# Patient Record
Sex: MALE | Race: WHITE | NOT HISPANIC OR LATINO | URBAN - METROPOLITAN AREA
[De-identification: names, ages, dates, MRNs, and addresses within clinical notes are randomized per-mention and may not be internally consistent; named-entity substitution may affect disease eponyms.]

---

## 2017-07-13 ENCOUNTER — OFFICE VISIT - HEALTHEAST (OUTPATIENT)
Dept: FAMILY MEDICINE | Facility: CLINIC | Age: 42
End: 2017-07-13

## 2017-07-13 DIAGNOSIS — M79.674 GREAT TOE PAIN, RIGHT: ICD-10-CM

## 2017-07-13 ASSESSMENT — MIFFLIN-ST. JEOR: SCORE: 2124.56

## 2018-02-14 ENCOUNTER — COMMUNICATION - HEALTHEAST (OUTPATIENT)
Dept: FAMILY MEDICINE | Facility: CLINIC | Age: 43
End: 2018-02-14

## 2018-04-18 ENCOUNTER — OFFICE VISIT - HEALTHEAST (OUTPATIENT)
Dept: FAMILY MEDICINE | Facility: CLINIC | Age: 43
End: 2018-04-18

## 2018-04-18 DIAGNOSIS — Z71.84 TRAVEL ADVICE ENCOUNTER: ICD-10-CM

## 2018-05-22 ENCOUNTER — COMMUNICATION - HEALTHEAST (OUTPATIENT)
Dept: FAMILY MEDICINE | Facility: CLINIC | Age: 43
End: 2018-05-22

## 2018-05-29 ENCOUNTER — AMBULATORY - HEALTHEAST (OUTPATIENT)
Dept: NURSING | Facility: CLINIC | Age: 43
End: 2018-05-29

## 2018-05-29 ENCOUNTER — AMBULATORY - HEALTHEAST (OUTPATIENT)
Dept: FAMILY MEDICINE | Facility: CLINIC | Age: 43
End: 2018-05-29

## 2018-05-29 DIAGNOSIS — Z23 IMMUNIZATION DUE: ICD-10-CM

## 2020-02-10 ENCOUNTER — OFFICE VISIT - HEALTHEAST (OUTPATIENT)
Dept: FAMILY MEDICINE | Facility: CLINIC | Age: 45
End: 2020-02-10

## 2020-02-10 DIAGNOSIS — Z71.84 TRAVEL ADVICE ENCOUNTER: ICD-10-CM

## 2020-02-10 DIAGNOSIS — Z00.00 ROUTINE GENERAL MEDICAL EXAMINATION AT A HEALTH CARE FACILITY: ICD-10-CM

## 2020-02-10 DIAGNOSIS — Z80.42 FAMILY HISTORY OF PROSTATE CANCER: ICD-10-CM

## 2020-02-10 DIAGNOSIS — R03.0 PREHYPERTENSION: ICD-10-CM

## 2020-02-10 LAB
ALBUMIN SERPL-MCNC: 4.2 G/DL (ref 3.5–5)
ALP SERPL-CCNC: 79 U/L (ref 45–120)
ALT SERPL W P-5'-P-CCNC: 33 U/L (ref 0–45)
ANION GAP SERPL CALCULATED.3IONS-SCNC: 7 MMOL/L (ref 5–18)
AST SERPL W P-5'-P-CCNC: 18 U/L (ref 0–40)
BILIRUB SERPL-MCNC: 0.6 MG/DL (ref 0–1)
BUN SERPL-MCNC: 14 MG/DL (ref 8–22)
CALCIUM SERPL-MCNC: 9.4 MG/DL (ref 8.5–10.5)
CHLORIDE BLD-SCNC: 107 MMOL/L (ref 98–107)
CHOLEST SERPL-MCNC: 219 MG/DL
CO2 SERPL-SCNC: 29 MMOL/L (ref 22–31)
CREAT SERPL-MCNC: 0.82 MG/DL (ref 0.7–1.3)
ERYTHROCYTE [DISTWIDTH] IN BLOOD BY AUTOMATED COUNT: 12.3 % (ref 11–14.5)
FASTING STATUS PATIENT QL REPORTED: ABNORMAL
GFR SERPL CREATININE-BSD FRML MDRD: >60 ML/MIN/1.73M2
GLUCOSE BLD-MCNC: 62 MG/DL (ref 70–125)
HCT VFR BLD AUTO: 49.7 % (ref 40–54)
HDLC SERPL-MCNC: 40 MG/DL
HGB BLD-MCNC: 17.2 G/DL (ref 14–18)
LDLC SERPL CALC-MCNC: ABNORMAL MG/DL
MCH RBC QN AUTO: 30.8 PG (ref 27–34)
MCHC RBC AUTO-ENTMCNC: 34.7 G/DL (ref 32–36)
MCV RBC AUTO: 89 FL (ref 80–100)
PLATELET # BLD AUTO: 196 THOU/UL (ref 140–440)
PMV BLD AUTO: 7.3 FL (ref 7–10)
POTASSIUM BLD-SCNC: 4.3 MMOL/L (ref 3.5–5)
PROT SERPL-MCNC: 7.4 G/DL (ref 6–8)
PSA SERPL-MCNC: 1.1 NG/ML (ref 0–2.5)
RBC # BLD AUTO: 5.6 MILL/UL (ref 4.4–6.2)
SODIUM SERPL-SCNC: 143 MMOL/L (ref 136–145)
TRIGL SERPL-MCNC: 444 MG/DL
WBC: 6.9 THOU/UL (ref 4–11)

## 2020-02-10 ASSESSMENT — MIFFLIN-ST. JEOR: SCORE: 2117.77

## 2020-02-19 ENCOUNTER — COMMUNICATION - HEALTHEAST (OUTPATIENT)
Dept: FAMILY MEDICINE | Facility: CLINIC | Age: 45
End: 2020-02-19

## 2020-03-02 ENCOUNTER — COMMUNICATION - HEALTHEAST (OUTPATIENT)
Dept: LAB | Facility: CLINIC | Age: 45
End: 2020-03-02

## 2020-03-02 DIAGNOSIS — E78.2 MIXED HYPERLIPIDEMIA: ICD-10-CM

## 2021-05-31 VITALS — BODY MASS INDEX: 30.69 KG/M2 | WEIGHT: 252 LBS | HEIGHT: 76 IN

## 2021-06-01 VITALS — BODY MASS INDEX: 31.32 KG/M2 | WEIGHT: 257.31 LBS

## 2021-06-04 VITALS
WEIGHT: 254 LBS | HEIGHT: 75 IN | HEART RATE: 94 BPM | SYSTOLIC BLOOD PRESSURE: 139 MMHG | BODY MASS INDEX: 31.58 KG/M2 | RESPIRATION RATE: 20 BRPM | DIASTOLIC BLOOD PRESSURE: 98 MMHG | TEMPERATURE: 97.2 F

## 2021-06-06 NOTE — TELEPHONE ENCOUNTER
"Patient coming in 3/9/20 for lipid recheck:    \"Notes recorded by Monica Paredes MD on 2/11/2020 at 5:32 PM CST  Triglycerides are elevated and some of this could be due that we did the lab without fasting.  At your convenience please set a fasting lab appointment for lipid profile.\"    Please place order, thanks!  "

## 2021-06-06 NOTE — TELEPHONE ENCOUNTER
Spouse notified. States he is aware vaccine is in and will schedule appointment within the next few weeks to come in and get it.

## 2021-06-06 NOTE — PATIENT INSTRUCTIONS - HE
Nurse apt in about a month for BP check. If over 140/90, will start a medicatin the then BP check 2 weeks later and MD visit 5 months.    Oral typoid good until April 2023.    Hepatitis A shot #2 and done.    Hepatitis B shot #2 today, # 3 in 4 months or after.    Japanese Encephalitis shot #3 ordered, good for 6-10 years from 2018 shots.    Per CDC website, Malaria rare in the are of [place] you age traveling to.  Let ,me know if group is recommending it and I can send a script.    Flu shot today.        Health Maintenance   Topic Date Due     PREVENTIVE CARE VISIT  Every 1 year     HIV SCREENING  NA     ADVANCE CARE PLANNING  We would like a copy please     LIPID  Today     INFLUENZA VACCINE RULE BASED (1) Today     TD 18+ HE  Today     TDAP ADULT ONE TIME DOSE  Completed     Colonoscopy due at age 49 as long as no blood in stool, no change in bowel habits, no abdominal pain, or family history of colon cancer or colon polyps

## 2021-06-06 NOTE — TELEPHONE ENCOUNTER
Left message to call back for: Vaccine   Information to relay to patient:  LMTCB to schedule appointment for japanese encephalitis vaccine.

## 2021-06-06 NOTE — PROGRESS NOTES
Assessment/Plan:  1. Routine general medical examination at a health care facility  Lipid Profile   2. Family history of prostate cancer  PSA, Diagnostic (Prostatic-Specific Antigen)   3. Prehypertension  HM2(CBC w/o Differential)    Comprehensive Metabolic Panel   4. Travel advice encounter  Japanese Encephalitis IM    CANCELED: Trinidadian Encephalitis IM     Nurse apt in about a month for BP check. If over 140/90, will start a medicatin the then BP check 2 weeks later and MD visit 5 months.    Oral typoid good until April 2023.    Hepatitis A shot #2 and done.    Hepatitis B shot #2 today, # 3 in 4 months or after.    Japanese Encephalitis shot #3 ordered, good for 6-10 years from 2018 shots.    Per [employer] website, Malaria rare in the area of [place] you are traveling to.  Let me know if group is recommending it and I can send a script.    Flu shot today.    Patient is a 44 y.o. male here for physical exam. See health maintenance section below. Labs as ordered.        HPI    Chief Complaint   Patient presents with     Annual Exam     No Concerns       Health Maintenance   Topic Date Due     PREVENTIVE CARE VISIT  Every 1 year, sooner if high BP     HIV SCREENING  NA     ADVANCE CARE PLANNING  We would like a copy please     LIPID  Today     INFLUENZA VACCINE RULE BASED (1) Today     TD 18+ HE  Today     TDAP ADULT ONE TIME DOSE  Completed     Colonoscopy due at age 49 as long as no blood in stool, no change in bowel habits, no abdominal pain, or family history of colon cancer or colon polyps    Patient Active Problem List   Diagnosis     Obesity     Benign Paroxysmal Positional Vertigo     No current outpatient medications on file.       Patient is a 44 y.o. male presents for a physical exam and travel consult.    Elevated blood pressure: Patient has never been diagnosed with hypertension.  Is willing to watch this and start a medication if needed.  He does not smoke.  Denies any chest pain on exertion.    Travel  consult: He will be traveling to [facility] I believe in April.  The region he is traveling to is not a high risk of malaria.  He is due to finish up his hepatitis A series and continue hepatitis B shot series.  He did have 2 Chinese encephalitis shots in April 2018.  He had oral typhoid April 18, 2018 that is good for 5 years.  Per a lid search on up-to-date he is traveling to a Japanese encephalitis concerning area and is to have a third Japanese encephalitis shot.  This will then be good for 6 to 10 years from 2018.  This is not a risk for yellow fever.  No other concerns.    The following portions of the patient's history were reviewed and updated as appropriate: past medical history, past social history, past surgical history and problem list.    Review of Systems  Pertinent items are noted in HPI.  Immunization History   Administered Date(s) Administered     Hep A, Adult IM (19yr & older) 04/18/2018, 02/10/2020     Hep B, Adult 04/18/2018, 02/10/2020     Influenza,seasonal quad, PF, =/> 6months 02/10/2020     Japanese Encephalitis, IM 04/18/2018, 05/29/2018     Tdap 07/16/2010, 02/10/2020     Recent Results (from the past 240 hour(s))   HM2(CBC w/o Differential)   Result Value Ref Range    WBC 6.9 4.0 - 11.0 thou/uL    RBC 5.60 4.40 - 6.20 mill/uL    Hemoglobin 17.2 14.0 - 18.0 g/dL    Hematocrit 49.7 40.0 - 54.0 %    MCV 89 80 - 100 fL    MCH 30.8 27.0 - 34.0 pg    MCHC 34.7 32.0 - 36.0 g/dL    RDW 12.3 11.0 - 14.5 %    Platelets 196 140 - 440 thou/uL    MPV 7.3 7.0 - 10.0 fL   PSA, Diagnostic (Prostatic-Specific Antigen)   Result Value Ref Range    PSA 1.1 0.0 - 2.5 ng/mL   Lipid Profile   Result Value Ref Range    Triglycerides 444 (H) <=149 mg/dL    Cholesterol 219 (H) <=199 mg/dL    LDL Calculated      HDL Cholesterol 40 >=40 mg/dL    Patient Fasting > 8hrs? Unknown    Comprehensive Metabolic Panel   Result Value Ref Range    Sodium 143 136 - 145 mmol/L    Potassium 4.3 3.5 - 5.0 mmol/L    Chloride  "107 98 - 107 mmol/L    CO2 29 22 - 31 mmol/L    Anion Gap, Calculation 7 5 - 18 mmol/L    Glucose 62 (L) 70 - 125 mg/dL    BUN 14 8 - 22 mg/dL    Creatinine 0.82 0.70 - 1.30 mg/dL    GFR MDRD Af Amer >60 >60 mL/min/1.73m2    GFR MDRD Non Af Amer >60 >60 mL/min/1.73m2    Bilirubin, Total 0.6 0.0 - 1.0 mg/dL    Calcium 9.4 8.5 - 10.5 mg/dL    Protein, Total 7.4 6.0 - 8.0 g/dL    Albumin 4.2 3.5 - 5.0 g/dL    Alkaline Phosphatase 79 45 - 120 U/L    AST 18 0 - 40 U/L    ALT 33 0 - 45 U/L     I have had an Advance Directives discussion with the patient.  Objective:    BP (!) 139/98   Pulse 94   Temp 97.2  F (36.2  C) (Oral)   Resp 20   Ht 6' 3\" (1.905 m)   Wt (!) 254 lb (115.2 kg)   BMI 31.75 kg/m      BP (!) 139/98   Pulse 94   Temp 97.2  F (36.2  C) (Oral)   Resp 20   Ht 6' 3\" (1.905 m)   Wt (!) 254 lb (115.2 kg)   BMI 31.75 kg/m      General Appearance:    Alert, cooperative, no distress, appears stated age   Head:    Normocephalic, without obvious abnormality, atraumatic   Eyes:    PERRL, conjunctiva/corneas clear, EOM's intact, fundi     benign, both eyes        Ears:    Normal TM's and external ear canals, both ears   Nose:   Nares normal, septum midline, mucosa normal, no drainage    or sinus tenderness   Throat:   Lips, mucosa, and tongue normal; teeth and gums normal   Neck:   Supple, symmetrical, trachea midline, no adenopathy;        thyroid:  No enlargement/tenderness/nodules; no carotid    bruit or JVD   Back:     Symmetric, no curvature, ROM normal, no CVA tenderness   Lungs:     Clear to auscultation bilaterally, respirations unlabored   Chest wall:    No tenderness or deformity   Heart:    Regular rate and rhythm, S1 and S2 normal, no murmur, rub   or gallop   Abdomen:     Soft, non-tender, bowel sounds active all four quadrants,     no masses, no organomegaly   Genitalia:    Normal male without lesion, discharge or tenderness       Extremities:   Extremities normal, atraumatic, no cyanosis or " edema   Pulses:   2+ and symmetric all extremities   Skin:   Skin color, texture, turgor normal, no rashes or lesions   Lymph nodes:   Cervical, supraclavicular, and axillary nodes normal   Neurologic:   CNII-XII intact. Normal strength, sensation and reflexes       throughout

## 2021-06-11 NOTE — PROGRESS NOTES
Labs are normal, but uric acid is slightly on the high side. Will see how patient is doing with symptoms.

## 2021-06-11 NOTE — PROGRESS NOTES
"Assessment & Plan:  1. Great toe pain, right  Suspect gout, no injury that would have contributed to pain.  No history of arthritis.  Check level today and hold for results.  Patient given prescription for indomethacin to use and will let us know how it is working.  - Uric Acid  - HM2(CBC w/o Differential)  - C-Reactive Protein(CRP)      There are no Patient Instructions on file for this visit.    Orders Placed This Encounter   Procedures     Uric Acid     HM2(CBC w/o Differential)     C-Reactive Protein(CRP)     There are no discontinued medications.            Chief Complaint:   Chief Complaint   Patient presents with     Toe Pain     c/o big tow on right foot being swollen x 1 wk       History of Present Illness:  Mingo is a 41 y.o. male presenting to the clinic today with about 4-5 days of big toe pain on the right.  Been painful, swollen patient has been using intermittent ibuprofen.  There is been no known injury to the patella.  He rates the pain a 4 out of 10 today.  No history of arthritis or other musculoskeletal problems.  No pain in the other areas of the body..     Review of Systems:  All other systems are negative except as noted above.    PFSH:  Reviewed and updated    Tobacco Use:  History   Smoking Status     Never Smoker   Smokeless Tobacco     Not on file       Vitals:  Vitals:    07/13/17 0844   BP: 122/74   Patient Site: Right Arm   Patient Position: Sitting   Cuff Size: Adult Regular   Resp: 16   Weight: (!) 252 lb (114.3 kg)   Height: 6' 4\" (1.93 m)     Wt Readings from Last 3 Encounters:   07/13/17 (!) 252 lb (114.3 kg)       Physical Exam:  Constitutional:  Reveals an alert, cooperative, 41-year-old male in no acute distress.  Vitals:  Per nursing notes.  Musculoskeletal: CMS intact, distal pulses intact, right great toe swollen, slightly reddened.  Tender to touch.  Rheumatologic: Normal joints and nails of the hands.  Neurologic:  No gross focal deficits.   Lymph: No " lymphadenopathy.  Psychiatric:  Mood appropriate, memory intact.     Data Reviewed:  Additional History from Old Records or Another Person Summarized (2 total): None.     Decision to Obtain Extra information (1 total): None.     Radiology Tests Summarized and Ordered (XRAY/CT/MRI/DXA) (1 total): None.    Labs Reviewed and Ordered (1 total): None.    Medicine Tests Summarized and Ordered (EKG/ECHO/COLONOSCOPY/EGD) (1 total): None.    Independent Review of EKG or X-Ray (2 each): None.    The visit lasted a total of 20 minutes face to face with the patient. Over 50% of the time was spent counseling and educating the patient about plan of.    Medications:  Current Outpatient Prescriptions   Medication Sig Dispense Refill     indomethacin (INDOCIN) 50 MG capsule Take 1 capsule (50 mg total) by mouth 2 (two) times a day with meals. 20 capsule 0     No current facility-administered medications for this visit.        Total Data Points: 1    BEA Cunningham, CNP    This note has been dictated using voice recognition software. Any grammatical or context distortions are unintentional and inherent to the software

## 2021-06-17 NOTE — PROGRESS NOTES
ASSESSMENT & PLAN:  1. Travel advice encounter  Japanese Encephalitis IM       Patient Instructions   Hepatitis A shot # 1 today, # 2 in 6 months. Hepatitis B shot # 1 today, # 2 in 1 month, # 3 in 6 months.    Pack Pepto Bismol.     Set physical/ travel consult in August/September set physical/travel consult. Come fasting.     Oral Typhoid good for 5 years.     Japanese Encephalitis injection #1  and #2 a month after.     Take Cipro 500mg twice a day for 5 to 7 days for severe travelers diarrhea.           Z-pack given to take on trip.    Orders Placed This Encounter   Procedures     Hepatitis A adult 2 dose IM (19 yr & older)     Hepatitis B Vaccine Age 20 years and above     Persian Encephalitis IM     There are no discontinued medications.    No Follow-up on file.    CHIEF COMPLAINT:  Chief Complaint   Patient presents with     Travel Consult     mission trip late june       HISTORY OF PRESENT ILLNESS:  Mingo is a 42 y.o. male presenting to the clinic today for travel consult.     Travel Consult: He will be going to [facility] at the end of June for 2 weeks for a [employer] mission trip which will take place primarily indoors. He has gone to [place] in the past. He is amenable to receiving Japanese Encephalitis and typhoid prevention. He denies reactions to injections.     REVIEW OF SYSTEMS:   All other systems are negative.    PFSH:  Social: He is going to [facility] at the end of the year.   TOBACCO USE:  History   Smoking Status     Never Smoker   Smokeless Tobacco     Never Used       VITALS:  Vitals:    04/18/18 1141   BP: 134/80   Patient Site: Left Arm   Patient Position: Sitting   Cuff Size: Adult Large   Pulse: 72   Resp: 16   Weight: (!) 257 lb 5 oz (116.7 kg)     Wt Readings from Last 3 Encounters:   04/18/18 (!) 257 lb 5 oz (116.7 kg)   07/13/17 (!) 252 lb (114.3 kg)     Body mass index is 31.32 kg/(m^2).    PHYSICAL EXAM:  /80 (Patient Site: Left Arm, Patient Position: Sitting, Cuff Size: Adult Large)   Pulse 72  Resp 16  Wt (!) 257 lb 5 oz (116.7 kg)  BMI 31.32 kg/m2  General appearance: alert, appears stated age and cooperative  Neurologic: Grossly normal   Psych: Normal mood and affect.       QUALITY MEASURES:      ADDITIONAL HISTORY SUMMARIZED (2): None.  DECISION TO OBTAIN EXTRA INFORMATION (1): None.   RADIOLOGY TESTS (1): None.  LABS (1): None.  MEDICINE TESTS (1): None.  INDEPENDENT REVIEW (2 each): None.     The visit lasted a total of 20 minutes face to face with the patient. Over 50% of the time was spent counseling and educating the patient about for travel consult.     ILisa, am scribing for and in the presence of, Dr. Monica Paredes.    I, Dr. Monica Paredes MD, personally performed the services described in this documentation, as scribed by Lisa Romero in my presence, and it is both accurate and complete.    MEDICATIONS:  Current Outpatient Prescriptions   Medication Sig Dispense Refill     azithromycin (ZITHROMAX) 250 MG tablet 2 pills on day 1, then 1 pill a day for 4 days 6 tablet 1     ciprofloxacin HCl (CIPRO) 500 MG tablet Take 1 tablet (500 mg total) by mouth 2 (two) times a day for 14 days. 28 tablet 0     indomethacin (INDOCIN) 50 MG capsule Take 1 capsule (50 mg total) by mouth 2 (two) times a day with meals. 20 capsule 0     typhoid (VIVOTIF) SR capsule Take 1 capsule by mouth every other day. 4 capsule 0     No current facility-administered medications for this visit.        Total data points: 0

## 2021-06-26 ENCOUNTER — HEALTH MAINTENANCE LETTER (OUTPATIENT)
Age: 46
End: 2021-06-26

## 2021-10-16 ENCOUNTER — HEALTH MAINTENANCE LETTER (OUTPATIENT)
Age: 46
End: 2021-10-16

## 2022-07-17 ENCOUNTER — HEALTH MAINTENANCE LETTER (OUTPATIENT)
Age: 47
End: 2022-07-17

## 2022-09-25 ENCOUNTER — HEALTH MAINTENANCE LETTER (OUTPATIENT)
Age: 47
End: 2022-09-25

## 2023-08-06 ENCOUNTER — HEALTH MAINTENANCE LETTER (OUTPATIENT)
Age: 48
End: 2023-08-06

## 2025-03-15 ENCOUNTER — HEALTH MAINTENANCE LETTER (OUTPATIENT)
Age: 50
End: 2025-03-15

## 2025-04-02 ENCOUNTER — OFFICE VISIT (OUTPATIENT)
Dept: FAMILY MEDICINE | Facility: CLINIC | Age: 50
End: 2025-04-02
Payer: COMMERCIAL

## 2025-04-02 VITALS
RESPIRATION RATE: 16 BRPM | WEIGHT: 263 LBS | HEART RATE: 76 BPM | OXYGEN SATURATION: 94 % | HEIGHT: 75 IN | DIASTOLIC BLOOD PRESSURE: 118 MMHG | BODY MASS INDEX: 32.7 KG/M2 | SYSTOLIC BLOOD PRESSURE: 157 MMHG

## 2025-04-02 DIAGNOSIS — N50.89: ICD-10-CM

## 2025-04-02 DIAGNOSIS — I10 ESSENTIAL HYPERTENSION: Primary | ICD-10-CM

## 2025-04-02 DIAGNOSIS — R31.29 MICROSCOPIC HEMATURIA: Primary | ICD-10-CM

## 2025-04-02 DIAGNOSIS — R94.31 ABNORMAL ELECTROCARDIOGRAM: Primary | ICD-10-CM

## 2025-04-02 LAB
ALBUMIN SERPL BCG-MCNC: 4.5 G/DL (ref 3.5–5.2)
ALBUMIN UR-MCNC: 30 MG/DL
ANION GAP SERPL CALCULATED.3IONS-SCNC: 10 MMOL/L (ref 7–15)
APPEARANCE UR: CLEAR
ATRIAL RATE - MUSE: 70 BPM
BACTERIA #/AREA URNS HPF: ABNORMAL /HPF
BILIRUB UR QL STRIP: ABNORMAL
BUN SERPL-MCNC: 12.8 MG/DL (ref 6–20)
CA CARBONATE CRY #/AREA URNS HPF: ABNORMAL /HPF
CALCIUM SERPL-MCNC: 9.3 MG/DL (ref 8.8–10.4)
CHLORIDE SERPL-SCNC: 107 MMOL/L (ref 98–107)
COLOR UR AUTO: ABNORMAL
CREAT SERPL-MCNC: 1.09 MG/DL (ref 0.67–1.17)
DIASTOLIC BLOOD PRESSURE - MUSE: NORMAL MMHG
EGFRCR SERPLBLD CKD-EPI 2021: 83 ML/MIN/1.73M2
GLUCOSE SERPL-MCNC: 98 MG/DL (ref 70–99)
GLUCOSE UR STRIP-MCNC: NEGATIVE MG/DL
GRAN CASTS #/AREA URNS LPF: ABNORMAL /LPF
HCO3 SERPL-SCNC: 26 MMOL/L (ref 22–29)
HGB UR QL STRIP: NEGATIVE
HYALINE CASTS #/AREA URNS LPF: ABNORMAL /LPF
INTERPRETATION ECG - MUSE: NORMAL
KETONES UR STRIP-MCNC: NEGATIVE MG/DL
LEUKOCYTE ESTERASE UR QL STRIP: NEGATIVE
MUCOUS THREADS #/AREA URNS LPF: PRESENT /LPF
NITRATE UR QL: NEGATIVE
P AXIS - MUSE: 25 DEGREES
PH UR STRIP: 6 [PH] (ref 5–8)
PHOSPHATE SERPL-MCNC: 2.6 MG/DL (ref 2.5–4.5)
POTASSIUM SERPL-SCNC: 4.1 MMOL/L (ref 3.4–5.3)
PR INTERVAL - MUSE: 144 MS
QRS DURATION - MUSE: 98 MS
QT - MUSE: 416 MS
QTC - MUSE: 449 MS
R AXIS - MUSE: -15 DEGREES
RBC #/AREA URNS AUTO: ABNORMAL /HPF
SODIUM SERPL-SCNC: 143 MMOL/L (ref 135–145)
SP GR UR STRIP: >=1.03 (ref 1–1.03)
SQUAMOUS #/AREA URNS AUTO: ABNORMAL /LPF
SYSTOLIC BLOOD PRESSURE - MUSE: NORMAL MMHG
T AXIS - MUSE: 10 DEGREES
UROBILINOGEN UR STRIP-ACNC: 2 E.U./DL
VENTRICULAR RATE- MUSE: 70 BPM
WBC #/AREA URNS AUTO: ABNORMAL /HPF

## 2025-04-02 PROCEDURE — G2211 COMPLEX E/M VISIT ADD ON: HCPCS | Performed by: FAMILY MEDICINE

## 2025-04-02 PROCEDURE — 3080F DIAST BP >= 90 MM HG: CPT | Performed by: FAMILY MEDICINE

## 2025-04-02 PROCEDURE — 36415 COLL VENOUS BLD VENIPUNCTURE: CPT | Performed by: FAMILY MEDICINE

## 2025-04-02 PROCEDURE — 3077F SYST BP >= 140 MM HG: CPT | Performed by: FAMILY MEDICINE

## 2025-04-02 PROCEDURE — 93010 ELECTROCARDIOGRAM REPORT: CPT | Performed by: INTERNAL MEDICINE

## 2025-04-02 PROCEDURE — 81001 URINALYSIS AUTO W/SCOPE: CPT | Performed by: FAMILY MEDICINE

## 2025-04-02 PROCEDURE — 99204 OFFICE O/P NEW MOD 45 MIN: CPT | Performed by: FAMILY MEDICINE

## 2025-04-02 PROCEDURE — 93005 ELECTROCARDIOGRAM TRACING: CPT | Performed by: FAMILY MEDICINE

## 2025-04-02 PROCEDURE — 87086 URINE CULTURE/COLONY COUNT: CPT | Performed by: FAMILY MEDICINE

## 2025-04-02 PROCEDURE — 80069 RENAL FUNCTION PANEL: CPT | Performed by: FAMILY MEDICINE

## 2025-04-02 RX ORDER — HYDROCHLOROTHIAZIDE 25 MG/1
25 TABLET ORAL DAILY
Qty: 90 TABLET | Refills: 1 | Status: SHIPPED | OUTPATIENT
Start: 2025-04-02

## 2025-04-02 RX ORDER — SILDENAFIL 50 MG/1
50 TABLET, FILM COATED ORAL DAILY PRN
COMMUNITY

## 2025-04-02 NOTE — PROGRESS NOTES
Assessment & Plan       ICD-10-CM    1. Essential hypertension  I10 Home Blood Pressure Monitor Order for DME - ONLY FOR DME     hydrochlorothiazide (HYDRODIURIL) 25 MG tablet     UA Macroscopic with reflex to Microscopic and Culture - Clinic Collect     EKG 12-lead, tracing only     Renal panel (Alb, BUN, Ca, Cl, CO2, Creat, Gluc, Phos, K, Na)     Renal panel (Alb, BUN, Ca, Cl, CO2, Creat, Gluc, Phos, K, Na)     UA Microscopic with Reflex to Culture      2. Firm testis  N50.89 US Testicular & Scrotum w Doppler Ltd     Adult Urology  Referral        I am ordering a blood pressure cuff for home.  I do believe you pick this up at the medical supply show room at the [facility].  If it is too expensive and cheaper for you to buy over-the-counter you could do that.    Then we will have you check your blood pressure weekly at home.    Then we will help set a physical in 3 months.    Now with a new diagnosis of high blood pressure we will get an EKG, urinalysis for protein, renal profile.    Also are starting a diuretic called hydrochlorothiazide.  Will start 25 mg to take daily in the morning.    My nurse will help set a nurse appointment in 2 weeks for blood pressure check to ensure the medication is bringing her blood pressure down.    We want you to have a low-salt diet.    Ideally exercising 30 to 45 minutes daily.    Ordering a testicular and scrotal ultrasound.  Radiology should call you but if they do not call [phone] to set that up.  That will be at the [facility] or [facility].    I am also referring you to urology.  [facility] as prescribed your provider. If you don t hear from a representative within 2 business days, please call [phone].     The longitudinal plan of care for the diagnosis(es)/condition(s) as documented were addressed during this visit. Due to the added complexity in care, I will continue to  "support Garrison in the subsequent management and with ongoing continuity of care.  Helping to manage his hypertension.      BMI  Estimated body mass index is 32.87 kg/m  as calculated from the following:    Height as of this encounter: 1.905 m (6' 3\").    Weight as of this encounter: 119.3 kg (263 lb).   Weight management plan: Discussed healthy diet and exercise guidelines          Subjective   Garrison is a 49 year old, presenting for the following health issues:  testicular concern (Patient states that within the last month he has notice a change in \"texture\" of his right testicle. Denies any injury to the area. Denies any pain or discomfort )        4/2/2025     9:38 AM   Additional Questions   Roomed by Olga TOUSSAINT CMA     History of Present Illness       Reason for visit:  Lump/hardness in one testicle  Symptom onset:  3-4 weeks ago  Symptom intensity:  Mild  Symptom progression:  Staying the same  Had these symptoms before:  No   He is taking medications regularly.              Testicle change:  See above.  Feels a very firm right testicle compared to the left.  Noticed about a month ago.  Felt like it came on overnight.  Does self exams monthly.  Has not changed since noticing.  No injury.  No pain to palpation.  No redness.  No urinary concerns of burning urgency frequency, hesitancy, decreased force of stream or getting up more than 1 time at night to urinate    Hypertension: Does not have a diagnosis of hypertension, but that his blood pressure has been elevated when he was seen in clinic a long time ago.  Does walk routinely and no chest pain.  Is willing to have the workup for hypertension and start a medication.        Objective    BP (!) 157/118 (BP Location: Left arm, Patient Position: Sitting, Cuff Size: Adult Large)   Pulse 76   Resp 16   Ht 1.905 m (6' 3\")   Wt 119.3 kg (263 lb)   SpO2 94%   BMI 32.87 kg/m    Body mass index is 32.87 kg/m .  Physical Exam   GENERAL: alert and no distress   (male): " Left testicle soft mobile no palpable abnormalities, right testicle very firm without palpable nodules, no pain to palpation  EKG - Reviewed and interpreted by me normal sinus rhythm with possible left ventricular hypertrophy, cardiology to over read        Signed Electronically by: Monica Paredes MD

## 2025-04-03 ENCOUNTER — HOSPITAL ENCOUNTER (OUTPATIENT)
Dept: ULTRASOUND IMAGING | Facility: HOSPITAL | Age: 50
Discharge: HOME | End: 2025-04-03
Attending: FAMILY MEDICINE
Payer: COMMERCIAL

## 2025-04-03 DIAGNOSIS — N50.89: ICD-10-CM

## 2025-04-03 LAB — BACTERIA UR CULT: NO GROWTH

## 2025-04-03 PROCEDURE — 93976 VASCULAR STUDY: CPT

## 2025-04-16 ENCOUNTER — ALLIED HEALTH/NURSE VISIT (OUTPATIENT)
Dept: FAMILY MEDICINE | Facility: CLINIC | Age: 50
End: 2025-04-16
Payer: COMMERCIAL

## 2025-04-16 ENCOUNTER — OFFICE VISIT (OUTPATIENT)
Dept: UROLOGY | Facility: CLINIC | Age: 50
End: 2025-04-16
Payer: COMMERCIAL

## 2025-04-16 ENCOUNTER — TELEPHONE (OUTPATIENT)
Dept: FAMILY MEDICINE | Facility: CLINIC | Age: 50
End: 2025-04-16

## 2025-04-16 VITALS — SYSTOLIC BLOOD PRESSURE: 153 MMHG | DIASTOLIC BLOOD PRESSURE: 99 MMHG | HEART RATE: 73 BPM

## 2025-04-16 DIAGNOSIS — Z01.30 BLOOD PRESSURE CHECK: Primary | ICD-10-CM

## 2025-04-16 DIAGNOSIS — N50.89 TESTIS MASS: Primary | ICD-10-CM

## 2025-04-16 DIAGNOSIS — I10 ESSENTIAL HYPERTENSION: Primary | ICD-10-CM

## 2025-04-16 LAB
BASOPHILS # BLD AUTO: 0.1 10E3/UL (ref 0–0.2)
BASOPHILS NFR BLD AUTO: 1 %
EOSINOPHIL # BLD AUTO: 0.2 10E3/UL (ref 0–0.7)
EOSINOPHIL NFR BLD AUTO: 3 %
ERYTHROCYTE [DISTWIDTH] IN BLOOD BY AUTOMATED COUNT: 12.9 % (ref 10–15)
HCT VFR BLD AUTO: 47.8 % (ref 40–53)
HGB BLD-MCNC: 16.7 G/DL (ref 13.3–17.7)
IMM GRANULOCYTES # BLD: 0 10E3/UL
IMM GRANULOCYTES NFR BLD: 0 %
INR PPP: 1.02 (ref 0.85–1.15)
LDH SERPL L TO P-CCNC: 218 U/L (ref 0–250)
LYMPHOCYTES # BLD AUTO: 1.6 10E3/UL (ref 0.8–5.3)
LYMPHOCYTES NFR BLD AUTO: 29 %
MCH RBC QN AUTO: 29.5 PG (ref 26.5–33)
MCHC RBC AUTO-ENTMCNC: 34.9 G/DL (ref 31.5–36.5)
MCV RBC AUTO: 84 FL (ref 78–100)
MONOCYTES # BLD AUTO: 0.8 10E3/UL (ref 0–1.3)
MONOCYTES NFR BLD AUTO: 14 %
NEUTROPHILS # BLD AUTO: 2.9 10E3/UL (ref 1.6–8.3)
NEUTROPHILS NFR BLD AUTO: 53 %
NRBC # BLD AUTO: 0 10E3/UL
NRBC BLD AUTO-RTO: 0 /100
PLATELET # BLD AUTO: 210 10E3/UL (ref 150–450)
RBC # BLD AUTO: 5.67 10E6/UL (ref 4.4–5.9)
WBC # BLD AUTO: 5.4 10E3/UL (ref 4–11)

## 2025-04-16 PROCEDURE — 3077F SYST BP >= 140 MM HG: CPT

## 2025-04-16 PROCEDURE — 3080F DIAST BP >= 90 MM HG: CPT

## 2025-04-16 PROCEDURE — 99207 PR NO CHARGE NURSE ONLY: CPT

## 2025-04-16 RX ORDER — ACETAMINOPHEN 325 MG/1
975 TABLET ORAL ONCE
OUTPATIENT
Start: 2025-04-16 | End: 2025-04-16

## 2025-04-16 RX ORDER — HYDROCHLOROTHIAZIDE 50 MG/1
50 TABLET ORAL DAILY
Qty: 90 TABLET | Refills: 1 | Status: SHIPPED | OUTPATIENT
Start: 2025-04-16

## 2025-04-16 NOTE — NURSING NOTE
Mingo Panda's goals for this visit include:   Chief Complaint   Patient presents with    New Patient     Firm Testicle (right)         He requests these members of his care team be copied on today's visit information:       PCP: Monica Paredes    Referring Provider:  Referred Self, MD  No address on file    There were no vitals taken for this visit.    Do you need any medication refills at today's visit?     Leeann Landrum LPN on 4/16/2025 at 12:50 PM

## 2025-04-16 NOTE — TELEPHONE ENCOUNTER
Called and spoke with patient relayed PCP recommendations below and scheduled for nurse only BP check on 5/7.    Patricia Raya RN  ----------------------------------------------------------------------------------------

## 2025-04-16 NOTE — PATIENT INSTRUCTIONS
Surgery Instructions    Always follow your surgeon s instructions. If you don t, your surgery could be cancelled. Please use the following checklist.  Your surgery is on: The surgery scheduler will contact you within 1 week of your consult with the surgeon. If you do not hear from them, please call the clinic or RN Care Coordinator for your provider.    Time: Prearrival times can vary depending on location/type of surgery.  [place] - 2 hour pre-arrival  [facility] [place] - 2 hour pre-arrival  [place] - 1 hour pre-arrival  Note:  These times may change. A nurse will call you before surgery to confirm. If you have not received a call or if you have more questions, please call us on the working day before your surgery:  [place]: [phone] or [phone] (9am to 5:30pm)  [facility]: [phone] (8am to 6pm)  [place]: [phone] (9am to 5pm)  [facility] [phone] (7am to 4pm)  Prior to surgery  Have a pre-op physical exam with your Primary Doctor within 30 days of surgery  Ask your doctor to send all of your results to the surgery center/hospital before surgery. Your doctor also may ask you to bring the results with you on the day of surgery.  Tell your doctor if:  You are allergic to latex or rubber (latex and rubber gloves are often used in medical care).  You are taking any medicines (including aspirin), blood thinning medications, vitamins, or herbal products. You may need to stop taking some medicines before surgery. Please discuss this with your primary care provider.   There are certain weight loss and diabetes medications that have to be stopped prior to surgery. If they are not stopped within the correct time frame your surgery will need to be cancelled and rescheduled. If you are taking the following medications for weight loss or diabetes, they must be held for 7 days prior to surgery or you risk being cancelled and rescheduled: PHENTERMINE,  SEMAGLUTIDE (Ozempic, Wegovy and Rybelsus),  DULAGLUTIDE (Trulicity), EXENITIDE ( Byetta, Bydureon), TIRZEPATIDE (Mounjaro), LIRAGLUTIDE (victoza 3-Edgar and Victoza 2-Edgar). Please discuss this with your primary care provider. If holding these medications was not discussed in your pre-op history and physical appointment with primary care, please let us know ASAP so that we may clarify when the medication needs to be stopped.  You have any medical problems (allergies, diabetes, or heart disease, for example).  You have a pacemaker or an AICD (automatic implanted cardiac defibrillator). If you do, please bring the ID card with you on the day of surgery.  People who smoke have a higher risk of infection after surgery. Ask your doctor how you can quit smoking.  If you Primary Doctor is not within the [email or web address] system, you will need to have your pre-op physical faxed to us to be scanned into your chart.  [place]: [phone] or [phone]  [facility]): [phone]    Call your insurance company. Ask if you need pre-approval for your surgery. If you do not have insurance, please let us know. If you wish to speak to the , please alert the clinic staff so this can be arranged.  Arrange for someone to drive you home after surgery.  will need to be a responsible adult (18 years or older) that will provide transportation to and from surgery and stay in the waiting room during your surgery. You may not drive yourself or take public transportation to and from surgery.  Arrange for someone to stay with you for 24 hours after you go home. This person must be a responsible adult (18 years or older).  Call your surgeon or their nurse if there is any change in your health (cold, flu, infections, hospitalizations).  Do not smoke, drink alcohol, or take over-the-counter medicine for 24 hours before and after surgery.  If you take prescribed drugs, you may need to stop them until after the surgery.  Discuss what medications to take or  not take prior to surgery with your Primary Doctor at your pre-op physical. Avoid over-the-counter blood-thinning medications such as Aspirin, Ibuprofen, vitamin E, or fish oil 7 days prior to surgery (unless otherwise directed by your Primary Doctor). Tylenol is a good alternative for mild pain relief prior to surgery.  Eating and drinking guidelines prior to surgery:  Stop all solid food consumption 8 hours prior to surgery  Drink clear fluids until 2 hours before your arrival. These are liquids you can see through, like water, Gatorade, and Propel Water. They also can include plain black coffee and tea (no cream or milk), candy and breath mints. You can spit out gum when you arrive.  Follow instructions given for showering or bathing before surgery.    Use 8 ounces of antiseptic surgical soap, like:  Hibiclens, Scrub Care, or Exidine  You can find it at your local pharmacy, clinic, or retail store. If you have trouble, ask your pharmacist to help you find the right substitute.  Please wash with one of the above soaps twice before coming to the hospital for your surgery. This will decrease bacteria (germs) on your skin. It will also help reduce your chance of infection after surgery.  Items you will need for showerin newly washed washcloths  2 newly washed towels  8 ounces of one of the above soaps  Following these instructions:  The evening before surgery: Shower or bathe as you normally would, using your regular soap and a clean washcloth. Give special attention to places where your incision (surgical cut) or catheters will be. This includes your groin area. Rinse well. You may wash your hair with your regular shampoo. Next, wash your body with 4 ounces of the antiseptic soap. Use a clean, damp washcloth and gently clean your body (from the chin down). If your surgery involves your head, use the special soap on your head and scalp. Rinse well and dry off using a newly washed towel.  The morning of surgery:  Repeat the same process as the evening shower.  Other suggestions:  Do not shave within 12 inches of your incision (surgical cut) area for at least 3 days before surgery. Shaving can make small cuts in the skin. This puts you at higher risk of infection.  Wear freshly washed pajamas or clothing after your evening shower.  Wear freshly washed clothes the day of surgery.  Wash and change your bed sheets the day before surgery to have clean bed sheets after your shower and when you get home from surgery.  If you have trouble washing all areas, make sure someone helps you.  Don't use any deodorant, lotion or powder after your shower.  Women who are menstruating should wear a fresh sanitary pad to the hospital.  Do not wear or add deodorant, cologne, lotion, makeup, nail polish or jewelry to surgery. If you wear fake nails, please remove at least one nail before coming to surgery (an oxygen monitor needs to be placed on your finger during surgery).  Bring these items to the surgery center/hospital:  Insurance card  Money for parking and co-pays, if needed  A list of all the medicines you take. Include vitamins, minerals, herbs, and over-the-counter drugs.  Note any drug allergies.  A copy of your advance health care directive, if you have one. This tells us what treatment you would want--and who would make health care decisions--if you could no longer speak for yourself. You may request this form in advance or download it from [email or web address].  A case for glasses, contact lenses, hearing aids, or dentures.  Your inhaler or CPAP machine, if you use these at home.  Leave extra cash, jewelry, and other valuables at home.  When you arrive  When you get to the surgery center/hospital, you will:  Check in. If you are under age 18, you must be with a parent or legal guardian.  Sign consent forms, if you haven t already. These forms state that you know the risks and benefits of surgery. When you sign the forms, you give  us permission to do the surgery. Do not sign them unless you understand what will happen during and after your surgery. If you have any questions about your surgery, ask to speak with your doctor before you sign the forms. If you don t understand the answers, ask again.  Receive a copy of the Patient s Bill of Rights. If you do not receive a copy, please ask for one.  Change into hospital clothes. Your belongings will be placed in a bag. We will return them to you after surgery.  Meet with the anesthesia provider. He or she will tell you what kind of anesthesia (medicine) will be used to keep you comfortable during surgery.  Remember: it s okay to remind doctors and nurses to wash their hands before touching you.  In most cases, your surgeon will use a marker to write his or her initials on the surgery site. This ensures that the exact site is operated on.  For safety reasons, we will ask you the same questions many times. For example, we may ask your name and birth date over and over again.  Friends and family can stay with you until it s time for surgery. While you re in surgery, they will be in the waiting area. Please note that cell phones are not allowed in some patient care areas.  If you have questions about what will happen in the operating room, talk to your care team.  After surgery  We will move you to a recovery room, where we will watch you closely. If you have any pain or discomfort, tell your nurse. He or she will try to make you comfortable.  If you are staying overnight, we will move you to your hospital room after you are awake.  If you are going home, we will move you to another room. Friends and family may be able to join you. The length of time you spend in recovery depend on the type of medicine you received, your medical condition, the type of surgery you had, or your response to the anesthesia given during your procedure.  When you are discharged from the recovery room, the nurses will review  instructions with you and your caregiver.  Please wash your hands every time you touch the wound or change bandages or dressings.  Do not submerge the wound in water.  You may not use a bathtub or hot tub until the wound is closed. The wait time frame is generally 2-3 weeks, but any open area can be a source of incoming bacteria, so it is better to be on the safe side and avoid water submersion until your wound is fully healed.  You may take a shower 24 hours after surgery. Double check with your surgeon if it is OK for water to run over the wound, whether it has been sutured, stapled, glued, or is open. You may gently wash the wound using the antiseptic soap provided for your pre-surgery showering (do not use a washcloth). Any mild soap will work as well.  Many surgical wounds will have small white strips of tape on them called steri-strips.  Do not remove these. The edges will curl and fall off within 7-10 days with normal showering.  If you are going home with sutures (stitches) or staples, you must return to the clinic to have them taken out, usually within 1-2 weeks. Some stitches are dissolvable and do not require removal. Make sure to clarify with your surgeon or surgery nurse reviewing discharge paperwork what kind of sutures you have.  Signs and symptoms of infection include:  Fever, temperature over 101.5   F  Redness  Swelling  Increased pain  Green or yellow drainage which may or may not have a foul odor  Dealing with pain  A nurse will check your comfort level often during your stay. He or she will work with you to manage your pain.  Remember:  All pain is real. There are many ways to control pain. We can help you decide what works best for you.  Ask for pain medicine when you need it. Don t try to  tough it out --this can make you feel worse. Always take your medicine as ordered.  Medicine doesn t work the same for everyone. If your medicine isn t working, tell your nurse. There may be other medicines  or treatments we can try.  Going home  We will let you know when you re ready to leave the surgery center or hospital. Before you leave, we will tell you how to care for yourself at home and prevent infections. If you do not understand something, please say so. We will answer any questions you have. We will then help you get ready to leave.  Remember, you must have a responsible adult (18 years or older) to stay with you 24 hours after you leave the hospital.  Take it easy when you get home. You will need some time to recover--you may be more tired than you realize at first. Rest and relax for at least the first 24 hours at home. You ll feel better and heal faster if you take good care of yourself.  Follow the discharge instructions that are given to you when you leave the surgery center or hospital  Please call the clinic if you experience any problems during regular clinic hours (Monday-Friday 8:00am-5:00pm).  If you experience problems during non-clinic hours, please call the [facility] on-call line at [phone] and ask the  to page the on-call Provider for your specialty. The on-call Provider will call you back and can triage your symptoms and further advise. If you are having an emergency, always call 911 or seek immediate evaluation at the Emergency Room.  Locations  [facility]  Same-day surgery center - 2nd floor, check-in #5  [address] [place] [address]  [phone]  [email or web address] ([facility]  [address] [place] [address]  [phone]   [email or web address]    [address] [address]  [phone] (patient registration)  [phone] (main line)  [email or web address]  [facility],  [facility]  [address] [place] [address]  [facility], 3rd floor for check-in  [phone] (patient registration)  [phone] (main line)  [email or web address]  [facility]  [address] [place] [address]  [phone]  [email or web address]  [facility]  [address] [place] [address]  [phone]  [email or web address]  [facility]  [address] [place] [address]  [phone]  [email or web address]  [facility]  [address] [place] [address]  [phone]  [email or web address]  [facility]  [address] [place] [address]  [phone]  [email or web address]  [facility]  [address] [place] [address]  [phone]  [email or web address]

## 2025-04-16 NOTE — PROGRESS NOTES
Blood pressure still elevated.  Increase hydrochlorothiazide to 50 mg a day.  He can take 2 of the 25 mg until gone and then I am sending a new prescription for 50 mg.  Please help had set blood pressure check in 2 weeks with a nurse appointment thanks.

## 2025-04-16 NOTE — PROGRESS NOTES
I am seeing Mingo Panda in consultation from Monica Paredes  for evaluation of right testis mass.    HPI:  Mingo Panda is a 49 year old male with gradually enlarging right tesits mass, first noticed about 1 month ago.  Enlarging right tesits, nontender.  Scrotal ultrasound shows right testis mass, concerning for malignancy.  No history of undescended testes.  History of vasectomy, not planning for future fertility at all.  2 adult children.  Works as a .    Past medical history minimal except for recent HTN diagnosis     PAST MEDICAL HX:  HTN     PAST SURG HX:  Past Surgical History:   Procedure Laterality Date    HC REPAIR ING HERNIA,5+Y/O,REDUCIBL      Description: Inguinal Hernia Repair For Person Over Age 5;  Recorded: 01/21/2010;  Comments: Biat, 77 and 79    RI CREATE EARDRUM OPENING,GEN ANESTH      Description: Ear Pressure Equalization Tube, Insertion, General Anesthesi;  Recorded: 01/21/2010;  Comments: 1980ish        FAMILY HX:  Family History   Problem Relation Age of Onset    Parkinsonism Mother     Prostate Cancer Father 53.00       SOCIAL HX:  Social History     Tobacco Use    Smoking status: Never    Smokeless tobacco: Never       MEDICATIONS:  Current Outpatient Medications   Medication Sig Dispense Refill    hydrochlorothiazide (HYDRODIURIL) 50 MG tablet Take 1 tablet (50 mg) by mouth daily. 90 tablet 1    sildenafil (VIAGRA) 50 MG tablet Take 50 mg by mouth daily as needed.       No current facility-administered medications for this visit.       ALLERGIES:  Patient has no known allergies.      GENERAL PHYSICAL EXAM:   Constitutional: No acute distress. Well nourished.   PSYCH: normal mood and affect.  NEURO: normal gait, no focal deficits.   EYES: anicteric, EOMI, PERR.  CARDIOPULMONARY: breathing non-labored, pulse regular rate/rhythm, no peripheral edema.  GI: Abdomen soft, non-tender, nondistended   MUSCULOSKELETAL: normal limb proportions, no muscle wasting, no  contractures.  SKIN: Normal virilized hair distribution, no lesions, warts or rashes over genitalia, abdomen extremities or face.  HEME/LYMPH: no ecchymosis, no lymphadenopathy in groin, no lymphedema.     EXAM:  Phallus  circumcised, meatus adequate, no plaques palpated.   Left testis descended , size is 22 , consistency is rock firm.    Right testis descended , size is 18 , consistency is normal. No intra-testicular masses.   Epididymes present, non-tender, not-enlarged.   Left cord: Vas present.   Right cord: Vas present.       Imaging/labs:  Lab Results   Component Value Date    CR 1.09 04/02/2025    CR 0.82 02/10/2020     Lab Results   Component Value Date    PSA 1.1 02/10/2020     Reviewed scrotal ultrasound 4/2025 that shows most of right testis parenchyma replaced with mass.    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    PLAN:  Testis tumor markers, PT/INR, and CBC today  Schedule right radical/ inguinal orchiectomy, within the next 2 weeks.  2 week post-op  2-4 week post-op with medical oncology.  Declines testis prosthesis.  Declines surgical sperm acquisition / fertility preservation at this time.  Discussed possible testosterone replacement therapy in future if hypogonadism and symptoms from this.  Discussed possible adjuant therapy or surgery will be needed, pending testis pathology and staging of such.      Copied cc to Consulting provider Monica Paredes        Thank-you for the kind consultation.  Jono Leonard MD     Urological Surgeon          --------------------------------------------------------------------------------------------------------------------   Additional Coding Information:    Problems:  4 -- one undiagnosed new problem with uncertain prognosis    Data Reviewed  Scrotal ultrasound, normal PSA.    Tests ordered/pending: 3 labs ordered     Level of risk:  3 -- low risk (e.g., OTC medication or observation, minor surgery without risks)    Time spent:  31  minutes spent on the date of the encounter doing chart review, history and exam, documentation and further activities per the note

## 2025-04-16 NOTE — PROGRESS NOTES
Follow Up Blood Pressure Check    Mingo Panda is a 49 year old male recommended to follow up for blood pressure check by Monica Paredesihypertensive medications and adherence were verified: Yes.   -hydrochlorothiazide 25 mg every day     Reason for visit: Elevated BP at 4/2 OV with PCP. Medication started.    Medication change at last visit: hydrochlorothiazide 25 mg every day     Today's Vitals:   Vitals:    04/16/25 1003 04/16/25 1016   BP: (!) 150/98 (!) 153/99   BP Location: Left arm Left arm   Patient Position: Chair Chair   Cuff Size: Adult Large Adult Large   Pulse: 72 73       Home blood pressure readings brought in today:   Home BP readings similar to clinic readings, no specific readings brought in    Lowest blood pressure today is <180/<110 and they deny signs or symptoms of new onset: severe headache, fatigue, confusion, vision changes, chest pain, pounding in the chest, neck, ears, irregular heartbeat, difficulty breathing, and blood in the urine.  Please inform patient of his/her blood pressure today.  If they are asymptomatic, the patient is to continue current medications.  This message will be routed to their provider, and they will be notified if a change in medication is recommended.    Patricia Raya RN    Current Outpatient Medications   Medication Sig Dispense Refill    hydrochlorothiazide (HYDRODIURIL) 25 MG tablet Take 1 tablet (25 mg) by mouth daily. 90 tablet 1    sildenafil (VIAGRA) 50 MG tablet Take 50 mg by mouth daily as needed.

## 2025-04-17 ENCOUNTER — TELEPHONE (OUTPATIENT)
Dept: UROLOGY | Facility: CLINIC | Age: 50
End: 2025-04-17
Payer: COMMERCIAL

## 2025-04-17 DIAGNOSIS — N50.89 TESTIS MASS: Primary | ICD-10-CM

## 2025-04-17 LAB
AFP SERPL-MCNC: 2.5 NG/ML
HCG-TM SERPL-ACNC: <3 IU/L

## 2025-04-17 NOTE — TELEPHONE ENCOUNTER
Referral sent. Post op scheduled     Kirsten Mccray, RN, BSN, PHN  Care Coordinator Urology  [facility] [place]  [facility]  [phone]

## 2025-04-17 NOTE — TELEPHONE ENCOUNTER
Called patient to schedule surgery with Dr. Leonard    Spoke with: Patient     Date of Surgery: 04/25/2025    Estimated Arrival time Discussed with Patient:  No - Likely mid to late morning    Location of surgery: [facility]     Pre-Op H&P: Instructed to schedule w/ PCP - Monica Paredes MD     Labs: No     Imaging: No     Post-Op Appt Date: No appointments available to schedule - sent to RN for review        Post-Op Imaging Date:  No     Discussed with patient pre-op RN will call 2-3 days prior to surgery with arrival time and instructions:  Yes     Standard Surgery Packet Sent: Yes 04/17/25  via [facility] Message      Additional Comments: Patient is aware to have a  for surgery.     Post-ops needed - 2 weeks with urology, 2-4 weeks with Dr. Link medical oncology. Routed to RN.       All patients questions were answered and was instructed to review surgical packet and call back with any questions or concerns.       Eamon Cain on 4/17/2025 at 3:21 PM

## 2025-04-17 NOTE — RESULT ENCOUNTER NOTE
Dearo Aguila     Here are your recent test results which are NORMAL.  There are no concerns.  Testis tumor markers are all negative.      Thank You,    Please let me know if you have any questions!    Andres SCHAEFER

## 2025-04-21 ENCOUNTER — TELEPHONE (OUTPATIENT)
Dept: UROLOGY | Facility: CLINIC | Age: 50
End: 2025-04-21

## 2025-04-21 ENCOUNTER — OFFICE VISIT (OUTPATIENT)
Dept: FAMILY MEDICINE | Facility: CLINIC | Age: 50
End: 2025-04-21
Payer: COMMERCIAL

## 2025-04-21 VITALS
OXYGEN SATURATION: 98 % | HEIGHT: 74 IN | SYSTOLIC BLOOD PRESSURE: 136 MMHG | BODY MASS INDEX: 33.5 KG/M2 | DIASTOLIC BLOOD PRESSURE: 86 MMHG | TEMPERATURE: 98.1 F | RESPIRATION RATE: 20 BRPM | HEART RATE: 75 BPM | WEIGHT: 261 LBS

## 2025-04-21 DIAGNOSIS — Z01.818 PREOP GENERAL PHYSICAL EXAM: Primary | ICD-10-CM

## 2025-04-21 PROCEDURE — 3075F SYST BP GE 130 - 139MM HG: CPT

## 2025-04-21 PROCEDURE — 99214 OFFICE O/P EST MOD 30 MIN: CPT

## 2025-04-21 PROCEDURE — 3079F DIAST BP 80-89 MM HG: CPT

## 2025-04-21 NOTE — PROGRESS NOTES
Preoperative Evaluation  [facility]  [address] [address]  Phone: [phone]  Fax: [phone]  Primary Provider: Monica Paredes MD  Pre-op Performing Provider: Edwardo Dennis MD  Apr 21, 2025 4/19/2025   Surgical Information   What procedure is being done? Orichectomy   Facility or Hospital where procedure/surgery will be performed: [facility]   Who is doing the procedure / surgery? Jono Leonard   Date of surgery / procedure: 4/25/25   Time of surgery / procedure: [facility]   Where do you plan to recover after surgery? at home with family     Fax number for surgical facility: Note does not need to be faxed, will be available electronically in Epic.    Assessment & Plan     The proposed surgical procedure is considered LOW risk.    Preop general physical exam  Testicular Cancer  Garrison is scheduled for an orchiectomy to address the localized testicular cancer. The cancer is confined to the right testicle, with no evidence of metastasis.  - Proceed with the scheduled orchiectomy.  - Monitor post-operative recovery and follow-up for any signs of recurrence.    Hypertension  Garrison's hypertension is well-controlled with medication HCTZ, and his blood pressure readings are within the normal range.  - Continue current blood pressure medication regimen.  - Do not take hydrochlorothiazide on the day of surgery, but it can be taken before and after.  - Monitor blood pressure regularly to ensure continued control.    Confirmed Sleep Apnea:   Garrison's sleep apnea is managed with a CPAP machine, which he uses regularly. He reports excessive snoring and daytime drowsiness.  - Confirm with the surgery center if the CPAP machine is required for the procedure.     Risks and Recommendations  The patient has the following additional risks and recommendations for perioperative complications:  Obstructive Sleep Apnea:     Preoperative Medication  Instructions  Antiplatelet or Anticoagulation Medication Instructions   - We reviewed the medication list and the patient is not on an antiplatelet or anticoagulation medications.    Additional Medication Instructions   - Diuretics (furosemide, hydrochlorothiazide, chlorothalidone): DO NOT TAKE on the day of surgery.    Recommendation  Approval given to proceed with proposed procedure, without further diagnostic evaluation.      Subjective   Garrison is a 49 year old, presenting for the following:  Pre-Op Exam (Dr. Diego - April 25th - Right orchiectomy [facility] )          4/21/2025     1:28 PM   Additional Questions   Roomed by KIMBERLEE MARSHALL   Accompanied by Self     HPI:   Pre op appointment for testicular cancer  Garrison Panda, a 49-year-old male, has been diagnosed with testicular cancer localized to the right testicle. He is scheduled for an orchiectomy on Friday. All other tests have come back clear, indicating no spread beyond the localized area. He is here for a pre-op exam    Sleep Apnea  Garrison reports having sleep apnea and uses a CPAP machine. He experiences excessive snoring and daytime drowsiness and is due for a new CPAP machine.    Hypertension  Garrison has a history of high blood pressure and is currently on blood pressure medication. His blood pressure readings have been in the normal range, and he has no history of heart failure.        4/19/2025   Pre-Op Questionnaire   Have you ever had a heart attack or stroke? No   Have you ever had surgery on your heart or blood vessels, such as a stent placement, a coronary artery bypass, or surgery on an artery in your head, neck, heart, or legs? No   Do you have chest pain with activity? No   Do you have a history of heart failure? No   Do you currently have a cold, bronchitis or symptoms of other infection? No   Do you have a cough, shortness of breath, or wheezing? No   Do you or anyone in your family have previous history of blood clots? No   Do you or does  anyone in your family have a serious bleeding problem such as prolonged bleeding following surgeries or cuts? No   Have you ever had problems with anemia or been told to take iron pills? No   Have you had any abnormal blood loss such as black, tarry or bloody stools? No   Have you ever had a blood transfusion? No   Are you willing to have a blood transfusion if it is medically needed before, during, or after your surgery? Yes   Have you or any of your relatives ever had problems with anesthesia? No   Do you have sleep apnea, excessive snoring or daytime drowsiness? (!) YES   Do you have a CPAP machine? Yes   Do you have any artifical heart valves or other implanted medical devices like a pacemaker, defibrillator, or continuous glucose monitor? No   Do you have artificial joints? No   Are you allergic to latex? No     Advance Care Planning    Discussed advance care planning with patient; informed AVS has link to [employer].    Preoperative Review of    reviewed - no record of controlled substances prescribed.      Status of Chronic Conditions:  HYPERTENSION - Patient has longstanding history of HTN , currently denies any symptoms referable to elevated blood pressure. Specifically denies chest pain, palpitations, dyspnea, orthopnea, PND or peripheral edema. Blood pressure readings have been in normal range. Current medication regimen is as listed below. Patient denies any side effects of medication.     SLEEP PROBLEM - Patient has a longstanding history of snoring and excessive daytime somnolence.. Patient has tried OTC medications with limited success.     Patient Active Problem List    Diagnosis Date Noted    Testis mass 04/16/2025     Priority: Medium    Obesity      Priority: Medium     Created by Conversion        Benign Paroxysmal Positional Vertigo      Priority: Medium     Created by Conversion          No past medical history on file.  Past Surgical History:   Procedure Laterality Date    HC  "REPAIR ING HERNIA,5+Y/O,REDUCIBL      Description: Inguinal Hernia Repair For Person Over Age 5;  Recorded: 01/21/2010;  Comments: [place], 77 and 79    MA CREATE EARDRUM OPENING,GEN ANESTH      Description: Ear Pressure Equalization Tube, Insertion, General Anesthesi;  Recorded: 01/21/2010;  Comments: 1980ish     Current Outpatient Medications   Medication Sig Dispense Refill    hydrochlorothiazide (HYDRODIURIL) 50 MG tablet Take 1 tablet (50 mg) by mouth daily. 90 tablet 1    sildenafil (VIAGRA) 50 MG tablet Take 50 mg by mouth daily as needed.         No Known Allergies     Social History     Tobacco Use    Smoking status: Never     Passive exposure: Never    Smokeless tobacco: Never   Substance Use Topics    Alcohol use: Not on file     Family History   Problem Relation Age of Onset    Parkinsonism Mother     Prostate Cancer Father 53.00     History   Drug Use Not on file               Objective    /86   Pulse 75   Temp 98.1  F (36.7  C) (Oral)   Resp 20   Ht 1.885 m (6' 2.21\")   Wt 118.4 kg (261 lb)   SpO2 98%   BMI 33.32 kg/m     Estimated body mass index is 33.32 kg/m  as calculated from the following:    Height as of this encounter: 1.885 m (6' 2.21\").    Weight as of this encounter: 118.4 kg (261 lb).  Physical Exam  GENERAL: alert and no distress  EYES: Eyes grossly normal to inspection, PERRL and conjunctivae and sclerae normal  HENT: ear canals and TM's normal, nose and mouth without ulcers or lesions,  tongue is quite large with Mallampati III stage  NECK: no adenopathy, no asymmetry, masses, or scars  RESP: lungs clear to auscultation - no rales, rhonchi or wheezes  CV: regular rate and rhythm, normal S1 S2, no S3 or S4, no murmur, click or rub, no peripheral edema  ABDOMEN: soft, nontender, no hepatosplenomegaly, no masses and bowel sounds normal  MS: no gross musculoskeletal defects noted, no edema  NEURO: Normal strength and tone, mentation intact and speech normal    Recent Labs   Lab " Test 04/16/25  1354 04/02/25  1021   HGB 16.7  --      --    INR 1.02  --    NA  --  143   POTASSIUM  --  4.1   CR  --  1.09      Diagnostics  Recent Results (from the past 720 hours)   EKG 12-lead, tracing only    Collection Time: 04/02/25  9:59 AM   Result Value Ref Range    Systolic Blood Pressure  mmHg    Diastolic Blood Pressure  mmHg    Ventricular Rate 70 BPM    Atrial Rate 70 BPM    AZ Interval 144 ms    QRS Duration 98 ms     ms    QTc 449 ms    P Axis 25 degrees    R AXIS -15 degrees    T Axis 10 degrees    Interpretation ECG       Sinus rhythm  Leftward axis  Voltage criteria for left ventricular hypertrophy  Abnormal ECG  No previous ECGs available  Confirmed by BARTOLOME ROLLINS MD LOC: ([id number]) on 4/2/2025 12:16:51 PM     Renal panel (Alb, BUN, Ca, Cl, CO2, Creat, Gluc, Phos, K, Na)    Collection Time: 04/02/25 10:21 AM   Result Value Ref Range    Sodium 143 135 - 145 mmol/L    Potassium 4.1 3.4 - 5.3 mmol/L    Chloride 107 98 - 107 mmol/L    Carbon Dioxide (CO2) 26 22 - 29 mmol/L    Anion Gap 10 7 - 15 mmol/L    Glucose 98 70 - 99 mg/dL    Urea Nitrogen 12.8 6.0 - 20.0 mg/dL    Creatinine 1.09 0.67 - 1.17 mg/dL    GFR Estimate 83 >60 mL/min/1.73m2    Calcium 9.3 8.8 - 10.4 mg/dL    Albumin 4.5 3.5 - 5.2 g/dL    Phosphorus 2.6 2.5 - 4.5 mg/dL   UA Macroscopic with reflex to Microscopic and Culture - Clinic Collect    Collection Time: 04/02/25 10:32 AM    Specimen: Urine, Clean Catch   Result Value Ref Range    Color Urine Dark Yellow (A) Colorless, Straw, Light Yellow, Yellow    Appearance Urine Clear Clear    Glucose Urine Negative Negative mg/dL    Bilirubin Urine Small (A) Negative    Ketones Urine Negative Negative mg/dL    Specific Gravity Urine >=1.030 1.005 - 1.030    Blood Urine Negative Negative    pH Urine 6.0 5.0 - 8.0    Protein Albumin Urine 30 (A) Negative mg/dL    Urobilinogen Urine 2.0 (A) 0.2, 1.0 E.U./dL    Nitrite Urine Negative Negative    Leukocyte Esterase Urine Negative  Negative   UA Microscopic with Reflex to Culture    Collection Time: 04/02/25 10:32 AM   Result Value Ref Range    Bacteria Urine Few (A) None Seen /HPF    RBC Urine 2-5 (A) 0-2 /HPF /HPF    WBC Urine 0-5 0-5 /HPF /HPF    Squamous Epithelials Urine Few (A) None Seen /LPF    Mucus Urine Present (A) None Seen /LPF    Calcium Carbonate Crystals Urine Few (A) None Seen /HPF    Hyaline Casts Urine 2-5 (A) None Seen /LPF    Granular Casts Urine 0-2 (A) None Seen /LPF   Urine Culture Aerobic Bacterial - lab collect    Collection Time: 04/02/25 10:32 AM    Specimen: Urine, Clean Catch   Result Value Ref Range    Culture No Growth    AFP tumor marker    Collection Time: 04/16/25  1:54 PM   Result Value Ref Range    AFP tumor marker 2.5 <=8.3 ng/mL   HCG tumor marker    Collection Time: 04/16/25  1:54 PM   Result Value Ref Range    Beta-HCG Tumor Marker <3 <5 IU/L   Lactate Dehydrogenase    Collection Time: 04/16/25  1:54 PM   Result Value Ref Range    Lactate Dehydrogenase 218 0 - 250 U/L   INR    Collection Time: 04/16/25  1:54 PM   Result Value Ref Range    INR 1.02 0.85 - 1.15   CBC with platelets and differential    Collection Time: 04/16/25  1:54 PM   Result Value Ref Range    WBC Count 5.4 4.0 - 11.0 10e3/uL    RBC Count 5.67 4.40 - 5.90 10e6/uL    Hemoglobin 16.7 13.3 - 17.7 g/dL    Hematocrit 47.8 40.0 - 53.0 %    MCV 84 78 - 100 fL    MCH 29.5 26.5 - 33.0 pg    MCHC 34.9 31.5 - 36.5 g/dL    RDW 12.9 10.0 - 15.0 %    Platelet Count 210 150 - 450 10e3/uL    % Neutrophils 53 %    % Lymphocytes 29 %    % Monocytes 14 %    % Eosinophils 3 %    % Basophils 1 %    % Immature Granulocytes 0 %    NRBCs per 100 WBC 0 <1 /100    Absolute Neutrophils 2.9 1.6 - 8.3 10e3/uL    Absolute Lymphocytes 1.6 0.8 - 5.3 10e3/uL    Absolute Monocytes 0.8 0.0 - 1.3 10e3/uL    Absolute Eosinophils 0.2 0.0 - 0.7 10e3/uL    Absolute Basophils 0.1 0.0 - 0.2 10e3/uL    Absolute Immature Granulocytes 0.0 <=0.4 10e3/uL    Absolute NRBCs 0.0  10e3/uL      EKG: Normal Sinus Rhythm, Voltage criteria for left ventricular hypertrophy. Echo to be done in May    Revised Cardiac Risk Index (RCRI)  The patient has the following serious cardiovascular risks for perioperative complications:   - No serious cardiac risks = 0 points     RCRI Interpretation: 0 points: Class I (very low risk - 0.4% complication rate)        Spent 30mins doing chart review, history and exam, patient education, documentation and further activities per the note.    Signed Electronically by: Edwardo Dennis MD  A copy of this evaluation report is provided to the requesting physician.

## 2025-04-21 NOTE — TELEPHONE ENCOUNTER
Patient scheduled     Kirsten Mccray, RN, BSN, PHN  Care Coordinator Urology  [facility] [place]  Urology [facility]  [phone]

## 2025-04-21 NOTE — H&P (VIEW-ONLY)
Preoperative Evaluation  [facility]  [address] [address]  Phone: [phone]  Fax: [phone]  Primary Provider: Monica Paredes MD  Pre-op Performing Provider: Edwardo Dennis MD  Apr 21, 2025 4/19/2025   Surgical Information   What procedure is being done? Orichectomy   Facility or Hospital where procedure/surgery will be performed: [facility]   Who is doing the procedure / surgery? Jono Leonard   Date of surgery / procedure: 4/25/25   Time of surgery / procedure: [facility]   Where do you plan to recover after surgery? at home with family     Fax number for surgical facility: Note does not need to be faxed, will be available electronically in Epic.    Assessment & Plan     The proposed surgical procedure is considered LOW risk.    Preop general physical exam  Testicular Cancer  Garrison is scheduled for an orchiectomy to address the localized testicular cancer. The cancer is confined to the right testicle, with no evidence of metastasis.  - Proceed with the scheduled orchiectomy.  - Monitor post-operative recovery and follow-up for any signs of recurrence.    Hypertension  Garrison's hypertension is well-controlled with medication HCTZ, and his blood pressure readings are within the normal range.  - Continue current blood pressure medication regimen.  - Do not take hydrochlorothiazide on the day of surgery, but it can be taken before and after.  - Monitor blood pressure regularly to ensure continued control.    Confirmed Sleep Apnea:   Garrison's sleep apnea is managed with a CPAP machine, which he uses regularly. He reports excessive snoring and daytime drowsiness.  - Confirm with the surgery center if the CPAP machine is required for the procedure.     Risks and Recommendations  The patient has the following additional risks and recommendations for perioperative complications:  Obstructive Sleep Apnea:     Preoperative Medication  Instructions  Antiplatelet or Anticoagulation Medication Instructions   - We reviewed the medication list and the patient is not on an antiplatelet or anticoagulation medications.    Additional Medication Instructions   - Diuretics (furosemide, hydrochlorothiazide, chlorothalidone): DO NOT TAKE on the day of surgery.    Recommendation  Approval given to proceed with proposed procedure, without further diagnostic evaluation.      Subjective   Garrison is a 49 year old, presenting for the following:  Pre-Op Exam (Dr. Diego - April 25th - Right orchiectomy [facility] )          4/21/2025     1:28 PM   Additional Questions   Roomed by KIMBERLEE MARSHALL   Accompanied by Self     HPI:   Pre op appointment for testicular cancer  Garrison Panda, a 49-year-old male, has been diagnosed with testicular cancer localized to the right testicle. He is scheduled for an orchiectomy on Friday. All other tests have come back clear, indicating no spread beyond the localized area. He is here for a pre-op exam    Sleep Apnea  Garrison reports having sleep apnea and uses a CPAP machine. He experiences excessive snoring and daytime drowsiness and is due for a new CPAP machine.    Hypertension  Garrison has a history of high blood pressure and is currently on blood pressure medication. His blood pressure readings have been in the normal range, and he has no history of heart failure.        4/19/2025   Pre-Op Questionnaire   Have you ever had a heart attack or stroke? No   Have you ever had surgery on your heart or blood vessels, such as a stent placement, a coronary artery bypass, or surgery on an artery in your head, neck, heart, or legs? No   Do you have chest pain with activity? No   Do you have a history of heart failure? No   Do you currently have a cold, bronchitis or symptoms of other infection? No   Do you have a cough, shortness of breath, or wheezing? No   Do you or anyone in your family have previous history of blood clots? No   Do you or does  anyone in your family have a serious bleeding problem such as prolonged bleeding following surgeries or cuts? No   Have you ever had problems with anemia or been told to take iron pills? No   Have you had any abnormal blood loss such as black, tarry or bloody stools? No   Have you ever had a blood transfusion? No   Are you willing to have a blood transfusion if it is medically needed before, during, or after your surgery? Yes   Have you or any of your relatives ever had problems with anesthesia? No   Do you have sleep apnea, excessive snoring or daytime drowsiness? (!) YES   Do you have a CPAP machine? Yes   Do you have any artifical heart valves or other implanted medical devices like a pacemaker, defibrillator, or continuous glucose monitor? No   Do you have artificial joints? No   Are you allergic to latex? No     Advance Care Planning    Discussed advance care planning with patient; informed AVS has link to [employer].    Preoperative Review of    reviewed - no record of controlled substances prescribed.      Status of Chronic Conditions:  HYPERTENSION - Patient has longstanding history of HTN , currently denies any symptoms referable to elevated blood pressure. Specifically denies chest pain, palpitations, dyspnea, orthopnea, PND or peripheral edema. Blood pressure readings have been in normal range. Current medication regimen is as listed below. Patient denies any side effects of medication.     SLEEP PROBLEM - Patient has a longstanding history of snoring and excessive daytime somnolence.. Patient has tried OTC medications with limited success.     Patient Active Problem List    Diagnosis Date Noted    Testis mass 04/16/2025     Priority: Medium    Obesity      Priority: Medium     Created by Conversion        Benign Paroxysmal Positional Vertigo      Priority: Medium     Created by Conversion          No past medical history on file.  Past Surgical History:   Procedure Laterality Date    HC  "REPAIR ING HERNIA,5+Y/O,REDUCIBL      Description: Inguinal Hernia Repair For Person Over Age 5;  Recorded: 01/21/2010;  Comments: Biat, 77 and 79    UT CREATE EARDRUM OPENING,GEN ANESTH      Description: Ear Pressure Equalization Tube, Insertion, General Anesthesi;  Recorded: 01/21/2010;  Comments: 1980ish     Current Outpatient Medications   Medication Sig Dispense Refill    hydrochlorothiazide (HYDRODIURIL) 50 MG tablet Take 1 tablet (50 mg) by mouth daily. 90 tablet 1    sildenafil (VIAGRA) 50 MG tablet Take 50 mg by mouth daily as needed.         No Known Allergies     Social History     Tobacco Use    Smoking status: Never     Passive exposure: Never    Smokeless tobacco: Never   Substance Use Topics    Alcohol use: Not on file     Family History   Problem Relation Age of Onset    Parkinsonism Mother     Prostate Cancer Father 53.00     History   Drug Use Not on file               Objective    /86   Pulse 75   Temp 98.1  F (36.7  C) (Oral)   Resp 20   Ht 1.885 m (6' 2.21\")   Wt 118.4 kg (261 lb)   SpO2 98%   BMI 33.32 kg/m     Estimated body mass index is 33.32 kg/m  as calculated from the following:    Height as of this encounter: 1.885 m (6' 2.21\").    Weight as of this encounter: 118.4 kg (261 lb).  Physical Exam  GENERAL: alert and no distress  EYES: Eyes grossly normal to inspection, PERRL and conjunctivae and sclerae normal  HENT: ear canals and TM's normal, nose and mouth without ulcers or lesions,  tongue is quite large with Mallampati III stage  NECK: no adenopathy, no asymmetry, masses, or scars  RESP: lungs clear to auscultation - no rales, rhonchi or wheezes  CV: regular rate and rhythm, normal S1 S2, no S3 or S4, no murmur, click or rub, no peripheral edema  ABDOMEN: soft, nontender, no hepatosplenomegaly, no masses and bowel sounds normal  MS: no gross musculoskeletal defects noted, no edema  NEURO: Normal strength and tone, mentation intact and speech normal    Recent Labs   Lab " Test 04/16/25  1354 04/02/25  1021   HGB 16.7  --      --    INR 1.02  --    NA  --  143   POTASSIUM  --  4.1   CR  --  1.09      Diagnostics  Recent Results (from the past 720 hours)   EKG 12-lead, tracing only    Collection Time: 04/02/25  9:59 AM   Result Value Ref Range    Systolic Blood Pressure  mmHg    Diastolic Blood Pressure  mmHg    Ventricular Rate 70 BPM    Atrial Rate 70 BPM    AK Interval 144 ms    QRS Duration 98 ms     ms    QTc 449 ms    P Axis 25 degrees    R AXIS -15 degrees    T Axis 10 degrees    Interpretation ECG       Sinus rhythm  Leftward axis  Voltage criteria for left ventricular hypertrophy  Abnormal ECG  No previous ECGs available  Confirmed by BARTOLOME ROLLINS MD LOC: ([id number]) on 4/2/2025 12:16:51 PM     Renal panel (Alb, BUN, Ca, Cl, CO2, Creat, Gluc, Phos, K, Na)    Collection Time: 04/02/25 10:21 AM   Result Value Ref Range    Sodium 143 135 - 145 mmol/L    Potassium 4.1 3.4 - 5.3 mmol/L    Chloride 107 98 - 107 mmol/L    Carbon Dioxide (CO2) 26 22 - 29 mmol/L    Anion Gap 10 7 - 15 mmol/L    Glucose 98 70 - 99 mg/dL    Urea Nitrogen 12.8 6.0 - 20.0 mg/dL    Creatinine 1.09 0.67 - 1.17 mg/dL    GFR Estimate 83 >60 mL/min/1.73m2    Calcium 9.3 8.8 - 10.4 mg/dL    Albumin 4.5 3.5 - 5.2 g/dL    Phosphorus 2.6 2.5 - 4.5 mg/dL   UA Macroscopic with reflex to Microscopic and Culture - Clinic Collect    Collection Time: 04/02/25 10:32 AM    Specimen: Urine, Clean Catch   Result Value Ref Range    Color Urine Dark Yellow (A) Colorless, Straw, Light Yellow, Yellow    Appearance Urine Clear Clear    Glucose Urine Negative Negative mg/dL    Bilirubin Urine Small (A) Negative    Ketones Urine Negative Negative mg/dL    Specific Gravity Urine >=1.030 1.005 - 1.030    Blood Urine Negative Negative    pH Urine 6.0 5.0 - 8.0    Protein Albumin Urine 30 (A) Negative mg/dL    Urobilinogen Urine 2.0 (A) 0.2, 1.0 E.U./dL    Nitrite Urine Negative Negative    Leukocyte Esterase Urine Negative  Negative   UA Microscopic with Reflex to Culture    Collection Time: 04/02/25 10:32 AM   Result Value Ref Range    Bacteria Urine Few (A) None Seen /HPF    RBC Urine 2-5 (A) 0-2 /HPF /HPF    WBC Urine 0-5 0-5 /HPF /HPF    Squamous Epithelials Urine Few (A) None Seen /LPF    Mucus Urine Present (A) None Seen /LPF    Calcium Carbonate Crystals Urine Few (A) None Seen /HPF    Hyaline Casts Urine 2-5 (A) None Seen /LPF    Granular Casts Urine 0-2 (A) None Seen /LPF   Urine Culture Aerobic Bacterial - lab collect    Collection Time: 04/02/25 10:32 AM    Specimen: Urine, Clean Catch   Result Value Ref Range    Culture No Growth    AFP tumor marker    Collection Time: 04/16/25  1:54 PM   Result Value Ref Range    AFP tumor marker 2.5 <=8.3 ng/mL   HCG tumor marker    Collection Time: 04/16/25  1:54 PM   Result Value Ref Range    Beta-HCG Tumor Marker <3 <5 IU/L   Lactate Dehydrogenase    Collection Time: 04/16/25  1:54 PM   Result Value Ref Range    Lactate Dehydrogenase 218 0 - 250 U/L   INR    Collection Time: 04/16/25  1:54 PM   Result Value Ref Range    INR 1.02 0.85 - 1.15   CBC with platelets and differential    Collection Time: 04/16/25  1:54 PM   Result Value Ref Range    WBC Count 5.4 4.0 - 11.0 10e3/uL    RBC Count 5.67 4.40 - 5.90 10e6/uL    Hemoglobin 16.7 13.3 - 17.7 g/dL    Hematocrit 47.8 40.0 - 53.0 %    MCV 84 78 - 100 fL    MCH 29.5 26.5 - 33.0 pg    MCHC 34.9 31.5 - 36.5 g/dL    RDW 12.9 10.0 - 15.0 %    Platelet Count 210 150 - 450 10e3/uL    % Neutrophils 53 %    % Lymphocytes 29 %    % Monocytes 14 %    % Eosinophils 3 %    % Basophils 1 %    % Immature Granulocytes 0 %    NRBCs per 100 WBC 0 <1 /100    Absolute Neutrophils 2.9 1.6 - 8.3 10e3/uL    Absolute Lymphocytes 1.6 0.8 - 5.3 10e3/uL    Absolute Monocytes 0.8 0.0 - 1.3 10e3/uL    Absolute Eosinophils 0.2 0.0 - 0.7 10e3/uL    Absolute Basophils 0.1 0.0 - 0.2 10e3/uL    Absolute Immature Granulocytes 0.0 <=0.4 10e3/uL    Absolute NRBCs 0.0  10e3/uL      EKG: Normal Sinus Rhythm, Voltage criteria for left ventricular hypertrophy. Echo to be done in May    Revised Cardiac Risk Index (RCRI)  The patient has the following serious cardiovascular risks for perioperative complications:   - No serious cardiac risks = 0 points     RCRI Interpretation: 0 points: Class I (very low risk - 0.4% complication rate)        Spent 30mins doing chart review, history and exam, patient education, documentation and further activities per the note.    Signed Electronically by: Edwardo Dennis MD  A copy of this evaluation report is provided to the requesting physician.

## 2025-04-21 NOTE — PATIENT INSTRUCTIONS

## 2025-04-21 NOTE — TELEPHONE ENCOUNTER
----- Message from Jono Leonard sent at 4/16/2025  1:25 PM CDT -----    Pt with new testis tumor.  Surgery orders placed  just now for right orchiectomy.  Would like to get this done in the next 1-2 weeks.   Friday afternoon during my admin time is OK due to urgency.  Also potentially OK for [facility] or [facility], [place] [facility]  [place] location is fine.  If not time for me to do it, let me know and we can ask Maribeth Zendejas Hinck, Becca Smith, Dr. Goldstein etc.    Thank You!!    LUCÍA

## 2025-04-24 ENCOUNTER — PRE VISIT (OUTPATIENT)
Dept: UROLOGY | Facility: CLINIC | Age: 50
End: 2025-04-24
Payer: COMMERCIAL

## 2025-04-24 NOTE — TELEPHONE ENCOUNTER
Reason for visit: Post op     Dx/Hx/Sx: orchiectomy done 4/25/25    Records/imaging/labs/orders: All records available    At Rooming: Caridad Calderon  4/24/2025  12:36 PM

## 2025-04-25 ENCOUNTER — HOSPITAL ENCOUNTER (OUTPATIENT)
Facility: AMBULATORY SURGERY CENTER | Age: 50
Discharge: HOME | End: 2025-04-25
Attending: UROLOGY
Payer: COMMERCIAL

## 2025-04-25 VITALS
WEIGHT: 260 LBS | TEMPERATURE: 97 F | HEART RATE: 71 BPM | DIASTOLIC BLOOD PRESSURE: 121 MMHG | HEIGHT: 74 IN | OXYGEN SATURATION: 94 % | SYSTOLIC BLOOD PRESSURE: 170 MMHG | RESPIRATION RATE: 14 BRPM | BODY MASS INDEX: 33.37 KG/M2

## 2025-04-25 DIAGNOSIS — G89.18 ACUTE POST-OPERATIVE PAIN: Primary | ICD-10-CM

## 2025-04-25 DIAGNOSIS — N50.89 TESTIS MASS: ICD-10-CM

## 2025-04-25 PROCEDURE — 54520 REMOVAL OF TESTIS: CPT | Mod: RT | Performed by: UROLOGY

## 2025-04-25 PROCEDURE — 88309 TISSUE EXAM BY PATHOLOGIST: CPT | Mod: TC | Performed by: UROLOGY

## 2025-04-25 PROCEDURE — 88309 TISSUE EXAM BY PATHOLOGIST: CPT | Mod: 26 | Performed by: PATHOLOGY

## 2025-04-25 PROCEDURE — 54520 REMOVAL OF TESTIS: CPT | Mod: RT

## 2025-04-25 RX ORDER — ONDANSETRON 2 MG/ML
4 INJECTION INTRAMUSCULAR; INTRAVENOUS EVERY 30 MIN PRN
Status: DISCONTINUED | OUTPATIENT
Start: 2025-04-25 | End: 2025-04-26 | Stop reason: HOSPADM

## 2025-04-25 RX ORDER — OXYCODONE HYDROCHLORIDE 5 MG/1
5 TABLET ORAL EVERY 6 HOURS PRN
Qty: 12 TABLET | Refills: 0 | Status: SHIPPED | OUTPATIENT
Start: 2025-04-25 | End: 2025-04-28

## 2025-04-25 RX ORDER — FENTANYL CITRATE 50 UG/ML
50 INJECTION, SOLUTION INTRAMUSCULAR; INTRAVENOUS EVERY 5 MIN PRN
Status: DISCONTINUED | OUTPATIENT
Start: 2025-04-25 | End: 2025-04-26 | Stop reason: HOSPADM

## 2025-04-25 RX ORDER — NALOXONE HYDROCHLORIDE 0.4 MG/ML
0.1 INJECTION, SOLUTION INTRAMUSCULAR; INTRAVENOUS; SUBCUTANEOUS
Status: DISCONTINUED | OUTPATIENT
Start: 2025-04-25 | End: 2025-04-26 | Stop reason: HOSPADM

## 2025-04-25 RX ORDER — ACETAMINOPHEN 325 MG/1
975 TABLET ORAL ONCE
Status: COMPLETED | OUTPATIENT
Start: 2025-04-25 | End: 2025-04-25

## 2025-04-25 RX ORDER — DEXAMETHASONE SODIUM PHOSPHATE 10 MG/ML
4 INJECTION, SOLUTION INTRAMUSCULAR; INTRAVENOUS
Status: DISCONTINUED | OUTPATIENT
Start: 2025-04-25 | End: 2025-04-26 | Stop reason: HOSPADM

## 2025-04-25 RX ORDER — ACETAMINOPHEN 325 MG/1
975 TABLET ORAL ONCE
Status: DISCONTINUED | OUTPATIENT
Start: 2025-04-25 | End: 2025-04-26 | Stop reason: HOSPADM

## 2025-04-25 RX ORDER — HALOPERIDOL 5 MG/ML
1 INJECTION INTRAMUSCULAR
Status: DISCONTINUED | OUTPATIENT
Start: 2025-04-25 | End: 2025-04-26 | Stop reason: HOSPADM

## 2025-04-25 RX ORDER — ACETAMINOPHEN 650 MG/1
650 SUPPOSITORY RECTAL ONCE
Status: COMPLETED | OUTPATIENT
Start: 2025-04-25 | End: 2025-04-25

## 2025-04-25 RX ORDER — LABETALOL HYDROCHLORIDE 5 MG/ML
10 INJECTION, SOLUTION INTRAVENOUS
Status: COMPLETED | OUTPATIENT
Start: 2025-04-25 | End: 2025-04-25

## 2025-04-25 RX ORDER — CEFAZOLIN SODIUM 2 G/50ML
2 SOLUTION INTRAVENOUS
Status: DISCONTINUED | OUTPATIENT
Start: 2025-04-25 | End: 2025-04-25 | Stop reason: HOSPADM

## 2025-04-25 RX ORDER — ONDANSETRON 4 MG/1
4 TABLET, ORALLY DISINTEGRATING ORAL EVERY 30 MIN PRN
Status: DISCONTINUED | OUTPATIENT
Start: 2025-04-25 | End: 2025-04-26 | Stop reason: HOSPADM

## 2025-04-25 RX ORDER — SODIUM CHLORIDE, SODIUM LACTATE, POTASSIUM CHLORIDE, CALCIUM CHLORIDE 600; 310; 30; 20 MG/100ML; MG/100ML; MG/100ML; MG/100ML
INJECTION, SOLUTION INTRAVENOUS CONTINUOUS
Status: DISCONTINUED | OUTPATIENT
Start: 2025-04-25 | End: 2025-04-26 | Stop reason: HOSPADM

## 2025-04-25 RX ORDER — FENTANYL CITRATE 50 UG/ML
25 INJECTION, SOLUTION INTRAMUSCULAR; INTRAVENOUS EVERY 5 MIN PRN
Status: DISCONTINUED | OUTPATIENT
Start: 2025-04-25 | End: 2025-04-26 | Stop reason: HOSPADM

## 2025-04-25 RX ORDER — CEFAZOLIN SODIUM 2 G/50ML
2 SOLUTION INTRAVENOUS SEE ADMIN INSTRUCTIONS
Status: DISCONTINUED | OUTPATIENT
Start: 2025-04-25 | End: 2025-04-25 | Stop reason: HOSPADM

## 2025-04-25 RX ORDER — HYDROMORPHONE HYDROCHLORIDE 1 MG/ML
0.4 INJECTION, SOLUTION INTRAMUSCULAR; INTRAVENOUS; SUBCUTANEOUS EVERY 5 MIN PRN
Status: DISCONTINUED | OUTPATIENT
Start: 2025-04-25 | End: 2025-04-26 | Stop reason: HOSPADM

## 2025-04-25 RX ORDER — LIDOCAINE 40 MG/G
CREAM TOPICAL
Status: DISCONTINUED | OUTPATIENT
Start: 2025-04-25 | End: 2025-04-25 | Stop reason: HOSPADM

## 2025-04-25 RX ORDER — OXYCODONE HYDROCHLORIDE 5 MG/1
5 TABLET ORAL
Status: COMPLETED | OUTPATIENT
Start: 2025-04-25 | End: 2025-04-25

## 2025-04-25 RX ORDER — SODIUM CHLORIDE, SODIUM LACTATE, POTASSIUM CHLORIDE, CALCIUM CHLORIDE 600; 310; 30; 20 MG/100ML; MG/100ML; MG/100ML; MG/100ML
INJECTION, SOLUTION INTRAVENOUS CONTINUOUS
Status: DISCONTINUED | OUTPATIENT
Start: 2025-04-25 | End: 2025-04-25 | Stop reason: HOSPADM

## 2025-04-25 RX ORDER — MAGNESIUM HYDROXIDE 1200 MG/15ML
LIQUID ORAL PRN
Status: DISCONTINUED | OUTPATIENT
Start: 2025-04-25 | End: 2025-04-25 | Stop reason: HOSPADM

## 2025-04-25 RX ORDER — HYDROMORPHONE HYDROCHLORIDE 1 MG/ML
0.2 INJECTION, SOLUTION INTRAMUSCULAR; INTRAVENOUS; SUBCUTANEOUS EVERY 5 MIN PRN
Status: DISCONTINUED | OUTPATIENT
Start: 2025-04-25 | End: 2025-04-26 | Stop reason: HOSPADM

## 2025-04-25 RX ORDER — DIMENHYDRINATE 50 MG/ML
25 INJECTION, SOLUTION INTRAMUSCULAR; INTRAVENOUS
Status: DISCONTINUED | OUTPATIENT
Start: 2025-04-25 | End: 2025-04-26 | Stop reason: HOSPADM

## 2025-04-25 RX ORDER — BUPIVACAINE HYDROCHLORIDE 5 MG/ML
INJECTION, SOLUTION PERINEURAL PRN
Status: DISCONTINUED | OUTPATIENT
Start: 2025-04-25 | End: 2025-04-25 | Stop reason: HOSPADM

## 2025-04-25 RX ORDER — ACETAMINOPHEN 325 MG/1
650 TABLET ORAL EVERY 4 HOURS PRN
Qty: 50 TABLET | Refills: 0 | Status: SHIPPED | OUTPATIENT
Start: 2025-04-25

## 2025-04-25 RX ORDER — OXYCODONE HYDROCHLORIDE 5 MG/1
10 TABLET ORAL
Status: DISCONTINUED | OUTPATIENT
Start: 2025-04-25 | End: 2025-04-26 | Stop reason: HOSPADM

## 2025-04-25 RX ORDER — ACETAMINOPHEN 325 MG/1
975 TABLET ORAL ONCE
Status: DISCONTINUED | OUTPATIENT
Start: 2025-04-25 | End: 2025-04-25 | Stop reason: HOSPADM

## 2025-04-25 RX ORDER — OXYCODONE HYDROCHLORIDE 5 MG/1
5 TABLET ORAL
Status: DISCONTINUED | OUTPATIENT
Start: 2025-04-25 | End: 2025-04-26 | Stop reason: HOSPADM

## 2025-04-25 RX ORDER — AMOXICILLIN 250 MG
1-2 CAPSULE ORAL 2 TIMES DAILY
Qty: 30 TABLET | Refills: 0 | Status: SHIPPED | OUTPATIENT
Start: 2025-04-25

## 2025-04-25 RX ADMIN — ACETAMINOPHEN 975 MG: 325 TABLET ORAL at 11:06

## 2025-04-25 RX ADMIN — OXYCODONE HYDROCHLORIDE 5 MG: 5 TABLET ORAL at 14:29

## 2025-04-25 RX ADMIN — LABETALOL HYDROCHLORIDE 10 MG: 5 INJECTION, SOLUTION INTRAVENOUS at 14:51

## 2025-04-25 NOTE — DISCHARGE INSTRUCTIONS
[facility]  Home Care Following Anesthesia  For 24 hours after surgery:  Get plenty of rest.  A responsible adult must stay with you for at least 24 hours after you leave the surgery center.  Do not drive or use heavy equipment.  If you have weakness or tingling, don't drive or use heavy equipment until this feeling goes away.   Do not drink alcohol.   Avoid strenuous or risky activities.  Ask for help when climbing stairs.  You may feel lightheaded.  IF so, sit for a few minutes before standing.  Have someone help you get up.   If you have nausea (feel sick to your stomach): Drink only clear liquids such as apple juice, ginger ale, broth or 7-Up.  Rest may also help.  Be sure to drink enough fluids.  Move to a regular diet as you feel able.   You may have a slight fever.  Call the doctor if your fever is over 100 F (37.7 C) (taken under the tongue) or lasts longer than 24 hours.  You may have a dry mouth, a sore throat, muscle aches or trouble sleeping. These should go away after 24 hours.  Do not make important or legal decisions.   It is recommended to avoid smoking.               Tips for taking pain medications  To get the best pain relief possible, remember these points:  Take pain medications as directed, before pain becomes severe.  Pain medication can upset your stomach: taking it with food may help.  Constipation is a common side effect of pain medication. Drink plenty of  fluids.  Eat foods high in fiber. Take a stool softener if recommended by your doctor or pharmacist.  Do not drink alcohol, drive or operate machinery while taking pain medications.  Ask about other ways to control pain, such as with heat, ice or relaxation.    Tylenol/Acetaminophen Consumption    If you feel your pain relief is insufficient, you may take Tylenol/Acetaminophen in addition to your narcotic pain medication.   Be careful not to exceed 4,000 mg of Tylenol/Acetaminophen in a 24 hour  period from all sources.  If you are taking extra strength Tylenol/acetaminophen (500 mg), the maximum dose is 8 tablets in 24 hours.  If you are taking regular strength acetaminophen (325 mg), the maximum dose is 12 tablets in 24 hours.  Tylenol 975 mg given at 11am.   Ok to take more after 5pm.      Call a doctor for any of the following:  Signs of infection (fever, growing tenderness at the surgery site, a large amount of drainage or bleeding, severe pain, foul-smelling drainage, redness, swelling).  It has been over 8 to 10 hours since surgery and you are still not able to urinate (pass water).  Headache for over 24 hours.  Signs of Covid-19 infection (temperature over 100 degrees, shortness of breath, cough, loss of taste/smell, generalized body aches, persistent headache, chills, sore throat, nausea/vomiting/diarrhea)    Your doctor is:  Dr. Jono Leonard, Prostate and Urology: [phone]                    After hours and weekends call the hospital @ [phone] and ask for the resident on call for:  Prostate Urology  For emergency care, call the:  [facility]:  [phone] (TTY for hearing impaired: [phone])

## 2025-04-25 NOTE — OP NOTE
Operative Report    DATE OF SERVICE: 4/25/25  PREOPERATIVE DIAGNOSIS: Right testicular mass   POSTOPERATIVE DIAGNOSIS: Right testicular mass    PROCEDURES PERFORMED: Right radical inguinal orchiectomy    STAFF SURGEON: Dr. Jono Leonard MD    ANESTHESIA: GETA  ESTIMATED BLOOD LOSS: 5 mL.   COMPLICATIONS: None.   SPECIMEN: Right testicle and spermatic cord  SIGNIFICANT FINDINGS: High ligation of the cord at the level approximately of the mid canal; testicle excised en bloc without any violation of the scrotum or tunica vaginalis.  DESCRIPTION OF PROCEDURE: After full informed voluntary consent was obtained, the patient was transported to the operating room, placed supine on the table. After adequate general anesthesia was induced, he was prepped and draped in the usual sterile fashion. A timeout was taken to confirm correct patient, procedure and laterality. Pneumoboots and pre-op IV antibiotics were used.    0.5% marcaine without epinephrine injection (10 cc) for skin analgesia was performed and a 5 cm Incision was made over the right inguinal canal. Electrocautery was used to carry dissection down to david's fascia which was subsequently opened bluntly using the Metzenbaum scissors exposing the cord. Blunt dissection was continued proximally and distally to free up the cord and bring it up to the incision. The cord was looped and occluded with a penrose drain tourniquet.     The right testis was then delivered into the wound using blunt and electrocautery dissection of the attachments to the scrotum, taking care to avoid any violation of the scrotal skin.  The gubernaculum was completely dissected and divided. The inguinal canal and the cord were dissected out as close to the internal ring as possible at approximately the level of the mid canal.    The spermatic cord was divided into two segments and separately clamped and divided as high as possible.  The ilioinguinal nerve was not encountered.  Each was then  stick tied with 0-silk suture, then an 0-Vicryl hand tie was placed below the stick tie to ensure hemostasis. The wound was then washed with 300cc NS. Hemostasis was excellent.  A spermatic cord block was performed.  One double strand of silk was left long in case future cord excision is required.    The external oblique fascia was re-approximated using 2-0 Vicryl in a running fashion. The incision was then closed beginning with closure of Carlos's and Camper's fascia in a deep dermal fashion using running 2-0 Vicryl sutures. The skin was then closed with a 4-0 Monocryl in a running subcuticular fashion.  Additional local anesthetic was injected around the incision.  Steri-Strips were placed. Primapore dressing applied. The patient was awakened from anesthesia and brought to the PACU in stable condition. The patient tolerated the procedure well and no intraoperative complications were noted.     Post-Op Plan   - Discharge from PACU once meets criteria  - Follow up as scheduled on 5/9/25    Germán Ca MD  Urology Resident PGY-2      I was present and scrubbed for the entire procedure.  Standard right radical orchiectomy without complications.  Jono Leonard MD  Urology Staff

## 2025-04-29 LAB
PATH REPORT.COMMENTS IMP SPEC: ABNORMAL
PATH REPORT.COMMENTS IMP SPEC: ABNORMAL
PATH REPORT.COMMENTS IMP SPEC: YES
PATH REPORT.FINAL DX SPEC: ABNORMAL
PATH REPORT.GROSS SPEC: ABNORMAL
PATH REPORT.MICROSCOPIC SPEC OTHER STN: ABNORMAL
PATH REPORT.RELEVANT HX SPEC: ABNORMAL
PATHOLOGY SYNOPTIC REPORT: ABNORMAL
PHOTO IMAGE: ABNORMAL

## 2025-04-30 DIAGNOSIS — C62.11 SEMINOMA OF DESCENDED RIGHT TESTIS (H): Primary | ICD-10-CM

## 2025-04-30 NOTE — RESULT ENCOUNTER NOTE
Needs to schedule CT abdomen/pelvis please.  Order placed.  Schedule prior to post-op appointment if possible, otherwise OK to schedule after his post-op appointment.  Thank You  LUCÍA  --------------------------------------------------------------------------------------------------------------------         Melvi Ji,     Here are your recent results.     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  This is the most common type of testis cancer.    The tumor was confined to the testis and completely removed, with negative margins.  Overall good news at this time.      We will need a CT abdomen/pelvis scheduled to make sure there are no enlarged lymph nodes, but I expect this scan to probably look normal.   I asked my office to help you get this set up.  I will see you back in clinic in the near future to check the incision.    Please let us know if you have any questions or concerns.    Thank You!  Andres SCHAEFER

## 2025-05-05 NOTE — TELEPHONE ENCOUNTER
RECORDS STATUS - ALL OTHER DIAGNOSIS      RECORDS RECEIVED FROM: [facility] - Internal records   DATE RECEIVED: 5/5

## 2025-05-08 ENCOUNTER — HOSPITAL ENCOUNTER (OUTPATIENT)
Dept: CARDIOLOGY | Facility: HOSPITAL | Age: 50
Discharge: HOME | End: 2025-05-08
Attending: FAMILY MEDICINE
Payer: COMMERCIAL

## 2025-05-08 DIAGNOSIS — R94.31 ABNORMAL ELECTROCARDIOGRAM: ICD-10-CM

## 2025-05-08 LAB — LVEF ECHO: NORMAL

## 2025-05-08 PROCEDURE — 93306 TTE W/DOPPLER COMPLETE: CPT

## 2025-05-09 ENCOUNTER — ANCILLARY PROCEDURE (OUTPATIENT)
Dept: CT IMAGING | Facility: CLINIC | Age: 50
End: 2025-05-09
Attending: UROLOGY
Payer: COMMERCIAL

## 2025-05-09 ENCOUNTER — RESULTS FOLLOW-UP (OUTPATIENT)
Dept: FAMILY MEDICINE | Facility: CLINIC | Age: 50
End: 2025-05-09

## 2025-05-09 DIAGNOSIS — C62.11 SEMINOMA OF DESCENDED RIGHT TESTIS (H): ICD-10-CM

## 2025-05-09 PROBLEM — I77.810 ASCENDING AORTA DILATATION: Status: ACTIVE | Noted: 2025-05-09

## 2025-05-09 PROCEDURE — 74177 CT ABD & PELVIS W/CONTRAST: CPT | Performed by: RADIOLOGY

## 2025-05-09 RX ORDER — IOPAMIDOL 755 MG/ML
144 INJECTION, SOLUTION INTRAVASCULAR ONCE
Status: COMPLETED | OUTPATIENT
Start: 2025-05-09 | End: 2025-05-09

## 2025-05-09 RX ADMIN — IOPAMIDOL 144 ML: 755 INJECTION, SOLUTION INTRAVASCULAR at 15:08

## 2025-05-11 LAB — RADIOLOGIST FLAGS: NORMAL

## 2025-05-12 ENCOUNTER — PATIENT OUTREACH (OUTPATIENT)
Dept: CARE COORDINATION | Facility: CLINIC | Age: 50
End: 2025-05-12
Payer: COMMERCIAL

## 2025-05-12 ENCOUNTER — TELEPHONE (OUTPATIENT)
Dept: CARDIOLOGY | Facility: CLINIC | Age: 50
End: 2025-05-12
Payer: COMMERCIAL

## 2025-05-12 ENCOUNTER — DOCUMENTATION ONLY (OUTPATIENT)
Dept: UROLOGY | Facility: CLINIC | Age: 50
End: 2025-05-12
Payer: COMMERCIAL

## 2025-05-12 NOTE — PROGRESS NOTES
Received a notification from imaging of a finding from pt's CT abd/pelvis, impression #2.Incidental note is a filling defect along the right atrium which  could represent motion artifact. Echocardiogram could be helpful in  confirmation  [Consider Follow Up: Right atrial filling defect possibly artifact].     Noted that pt was sent MCM 5/9/25 from Dr. Paredes regarding a referral to cardiovascular surgeon.       Sean VAZQUEZ RN  Urology Triage

## 2025-05-13 ENCOUNTER — RESULTS FOLLOW-UP (OUTPATIENT)
Dept: UROLOGY | Facility: CLINIC | Age: 50
End: 2025-05-13

## 2025-05-13 ENCOUNTER — PRE VISIT (OUTPATIENT)
Dept: ONCOLOGY | Facility: CLINIC | Age: 50
End: 2025-05-13
Payer: COMMERCIAL

## 2025-05-13 DIAGNOSIS — Q24.9 HEART ABNORMALITY: Primary | ICD-10-CM

## 2025-05-14 ENCOUNTER — PATIENT OUTREACH (OUTPATIENT)
Dept: CARE COORDINATION | Facility: CLINIC | Age: 50
End: 2025-05-14
Payer: COMMERCIAL

## 2025-05-14 NOTE — RESULT ENCOUNTER NOTE
Kirsten,   Can you please phone Mr. Panda tomorrow to make sure he sees this message?  He should schedule an echo please.  Order is in Epic.  Thanks!  Andres SCHAEFER    ------------------------------------------------------------------------------------------------------------------  Dear Garrison,     Here are your recent results.     The CT abdomen/pelvis is negative for any enlarged lymph nodes or evidence of any residual testis cancer- overall great news!    The radiologist saw a possible abnormality with the heart.  This is likely just a blurred picture of the beating heart muscle, but they cannot rule-out the presence of there being a filling defect inside the heart.  I looked at the images and I can't tell for sure if there is any anomaly.  You are otherwise very healthy so I think the possibility of any abnormality is low.  If you were to have sudden chest pain or breathing trouble, however, go to the emergency dept as soon as possible.    To be safe, I ordered you a cardiac ultrasound.  I asked my RN care coordinator to contact you ASAP to get this scheduled. I will send you the results via [employer] as soon as I seen them.    Please let us know if you have any questions or concerns.     Andres SCHAEFER

## 2025-05-27 ENCOUNTER — ONCOLOGY VISIT (OUTPATIENT)
Dept: ONCOLOGY | Facility: CLINIC | Age: 50
End: 2025-05-27
Attending: UROLOGY
Payer: COMMERCIAL

## 2025-05-27 VITALS
HEIGHT: 75 IN | OXYGEN SATURATION: 96 % | SYSTOLIC BLOOD PRESSURE: 165 MMHG | RESPIRATION RATE: 18 BRPM | HEART RATE: 82 BPM | WEIGHT: 265.9 LBS | BODY MASS INDEX: 33.06 KG/M2 | DIASTOLIC BLOOD PRESSURE: 122 MMHG | TEMPERATURE: 98.3 F

## 2025-05-27 DIAGNOSIS — C62.90 SEMINOMA (H): Primary | ICD-10-CM

## 2025-05-27 DIAGNOSIS — N50.89 TESTIS MASS: ICD-10-CM

## 2025-05-27 PROCEDURE — G0463 HOSPITAL OUTPT CLINIC VISIT: HCPCS | Performed by: INTERNAL MEDICINE

## 2025-05-27 ASSESSMENT — PAIN SCALES - GENERAL: PAINLEVEL_OUTOF10: NO PAIN (0)

## 2025-05-27 NOTE — PROGRESS NOTES
"Oncology Rooming Note    May 27, 2025 2:57 PM   Mingo Panda is a 49 year old male who presents for:    Chief Complaint   Patient presents with    Oncology Clinic Visit     New oncology visit     Initial Vitals: BP (!) 165/122 (BP Location: Right arm, Patient Position: Sitting)   Pulse 82   Temp 98.3  F (36.8  C) (Oral)   Resp 18   Ht 1.905 m (6' 3\")   Wt 120.6 kg (265 lb 14.4 oz)   SpO2 96%   BMI 33.24 kg/m   Estimated body mass index is 33.24 kg/m  as calculated from the following:    Height as of this encounter: 1.905 m (6' 3\").    Weight as of this encounter: 120.6 kg (265 lb 14.4 oz). Body surface area is 2.53 meters squared.  No Pain (0) Comment: Data Unavailable   No LMP for male patient.  Allergies reviewed: Yes  Medications reviewed: Yes    Medications: Medication refills not needed today.  Pharmacy name entered into [employer]: [facility] - [place], [place] - [address] [place] [address]    Frailty Screening:   Is the patient here for a new oncology consult visit in cancer care? 2. No    PHQ9:  Did this patient require a PHQ9?: No      Clinical concerns:  Dr. Vital was notified.      Simona Lea RN             "

## 2025-05-27 NOTE — PATIENT INSTRUCTIONS
CT of abdomen to be done in 4 month (mid September 20025)  and labs are drawn in ~4 month   follow  up in clinic after   CT is completed and labs are done (late September 2025).

## 2025-05-27 NOTE — LETTER
5/27/2025      Mingo Panda  [address]  [place] [address]      Dear Colleague,    Thank you for referring your patient, Mingo Panda, to the [facility] [place]. Please see a copy of my visit note below.    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 Pre-operative Us showed Right testicle measures 4.3 x 3.7 x 2.9 cm with two hypoechoic enhancing nodules within the right testicle; the largest measuring 3.6 x 2.7 x 2.6 cm and second largest measures 1.4 x 0.9 x 2.0 cm. CT of the abdomin/pelvis was negative for any pathological adenopathy.              Plan and Assessment: Patient is a 49 year old, male White who presents s/p /p  right radical orchiectomy done 4/25/25 which showed stage 1A pure seminoma.  NCCN recommends either observation, 1-2 cycle of single agent carboplatin or radiation.    As patient has elected to just proceed wutg observation, he follow up with labs, HP and CT of abdomen in every 4-6 month for year 1, every 6 month for year 2 etc per the NCCN guideliens.                  No past medical history on file.    Social History     Socioeconomic History     Marital status:      Spouse name: Not on file     Number of children: Not on file     Years of education: Not on file     Highest education level: Not on file   Occupational History     Not on file   Tobacco Use     Smoking status: Never     Passive exposure: Never     Smokeless tobacco: Never   Vaping Use     Vaping status: Never Used   Substance and Sexual Activity     Alcohol use: Not on file     Drug use: Not on file     Sexual activity: Not on file   Other Topics Concern     Not on file   Social History Narrative     Not on file     Social Drivers of Health     Financial  "Resource Strain: Not on file   Food Insecurity: Not on file   Transportation Needs: Not on file   Physical Activity: Not on file   Stress: Not on file   Social Connections: Not on file   Interpersonal Safety: Low Risk  (4/25/2025)    Interpersonal Safety      Do you feel physically and emotionally safe where you currently live?: Yes      Within the past 12 months, have you been hit, slapped, kicked or otherwise physically hurt by someone?: No      Within the past 12 months, have you been humiliated or emotionally abused in other ways by your partner or ex-partner?: No   Housing Stability: Not on file       Past Surgical History:   Procedure Laterality Date     HC REPAIR ING HERNIA,5+Y/O,REDUCIBL      Description: Inguinal Hernia Repair For Person Over Age 5;  Recorded: 01/21/2010;  Comments: Biat, 77 and 79     ORCHIECTOMY INGUINAL Right 4/25/2025    Procedure: ORCHIECTOMY, INGUINAL APPROACH;  Surgeon: Jono Leonard MD;  Location: [facility] OR     IA CREATE EARDRUM OPENING,GEN ANESTH      Description: Ear Pressure Equalization Tube, Insertion, General Anesthesi;  Recorded: 01/21/2010;  Comments: 1980ish       No Known Allergies    No images are attached to the encounter.           I saw and examined the patient for 60 mihutes where greater than 50% was spent coming up with a treatment plan, conferring with patient and reviewing history.       Oncology Rooming Note    May 27, 2025 2:57 PM   Mingo Panda is a 49 year old male who presents for:    Chief Complaint   Patient presents with     Oncology Clinic Visit     New oncology visit     Initial Vitals: BP (!) 165/122 (BP Location: Right arm, Patient Position: Sitting)   Pulse 82   Temp 98.3  F (36.8  C) (Oral)   Resp 18   Ht 1.905 m (6' 3\")   Wt 120.6 kg (265 lb 14.4 oz)   SpO2 96%   BMI 33.24 kg/m   Estimated body mass index is 33.24 kg/m  as calculated from the following:    Height as of this encounter: 1.905 m (6' 3\").    Weight as of this encounter: " 120.6 kg (265 lb 14.4 oz). Body surface area is 2.53 meters squared.  No Pain (0) Comment: Data Unavailable   No LMP for male patient.  Allergies reviewed: Yes  Medications reviewed: Yes    Medications: Medication refills not needed today.  Pharmacy name entered into Source MDx: [facility] [address] [place] [address] [address] [facility]    Frailty Screening:   Is the patient here for a new oncology consult visit in cancer care? 2. No    PHQ9:  Did this patient require a PHQ9?: No      Clinical concerns:  Dr. Vital was notified.      Simona Lea RN               Again, thank you for allowing me to participate in the care of your patient.        Sincerely,        Sixto Vital MD    Electronically signed

## 2025-05-27 NOTE — PROGRESS NOTES
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 Pre-operative Us showed Right testicle measures 4.3 x 3.7 x 2.9 cm with two hypoechoic enhancing nodules within the right testicle; the largest measuring 3.6 x 2.7 x 2.6 cm and second largest measures 1.4 x 0.9 x 2.0 cm. CT of the abdomin/pelvis was negative for any pathological adenopathy.              Plan and Assessment: Patient is a 49 year old, male White who presents s/p /p  right radical orchiectomy done 4/25/25 which showed stage 1A pure seminoma.  NCCN recommends either observation, 1-2 cycle of single agent carboplatin or radiation.    As patient has elected to just proceed wutg observation, he follow up with labs, HP and CT of abdomen in every 4-6 month for year 1, every 6 month for year 2 etc per the NCCN guideliens.                  No past medical history on file.    Social History     Socioeconomic History    Marital status:      Spouse name: Not on file    Number of children: Not on file    Years of education: Not on file    Highest education level: Not on file   Occupational History    Not on file   Tobacco Use    Smoking status: Never     Passive exposure: Never    Smokeless tobacco: Never   Vaping Use    Vaping status: Never Used   Substance and Sexual Activity    Alcohol use: Not on file    Drug use: Not on file    Sexual activity: Not on file   Other Topics Concern    Not on file   Social History Narrative    Not on file     Social Drivers of Health     Financial Resource Strain: Not on file   Food Insecurity: Not on file   Transportation Needs: Not on file   Physical Activity: Not on file   Stress: Not on file   Social Connections: Not on file   Interpersonal Safety: Low Risk  (4/25/2025)    Interpersonal Safety     Do you feel  physically and emotionally safe where you currently live?: Yes     Within the past 12 months, have you been hit, slapped, kicked or otherwise physically hurt by someone?: No     Within the past 12 months, have you been humiliated or emotionally abused in other ways by your partner or ex-partner?: No   Housing Stability: Not on file       Past Surgical History:   Procedure Laterality Date    HC REPAIR ING HERNIA,5+Y/O,REDUCIBL      Description: Inguinal Hernia Repair For Person Over Age 5;  Recorded: 01/21/2010;  Comments: Biat, 77 and 79    ORCHIECTOMY INGUINAL Right 4/25/2025    Procedure: ORCHIECTOMY, INGUINAL APPROACH;  Surgeon: Jono Leonard MD;  Location: [facility] OR    VA CREATE EARDRUM OPENING,GEN ANESTH      Description: Ear Pressure Equalization Tube, Insertion, General Anesthesi;  Recorded: 01/21/2010;  Comments: 1980ish       No Known Allergies    No images are attached to the encounter.           I saw and examined the patient for 60 mihutes where greater than 50% was spent coming up with a treatment plan, conferring with patient and reviewing history.

## 2025-05-28 ENCOUNTER — OFFICE VISIT (OUTPATIENT)
Dept: CARDIOLOGY | Facility: CLINIC | Age: 50
End: 2025-05-28
Payer: COMMERCIAL

## 2025-05-28 VITALS
DIASTOLIC BLOOD PRESSURE: 89 MMHG | HEART RATE: 99 BPM | RESPIRATION RATE: 14 BRPM | WEIGHT: 263 LBS | BODY MASS INDEX: 32.87 KG/M2 | SYSTOLIC BLOOD PRESSURE: 122 MMHG

## 2025-05-28 DIAGNOSIS — I77.810 ASCENDING AORTA DILATATION: Primary | ICD-10-CM

## 2025-05-28 PROCEDURE — 99203 OFFICE O/P NEW LOW 30 MIN: CPT | Performed by: THORACIC SURGERY (CARDIOTHORACIC VASCULAR SURGERY)

## 2025-05-28 PROCEDURE — 3079F DIAST BP 80-89 MM HG: CPT | Performed by: THORACIC SURGERY (CARDIOTHORACIC VASCULAR SURGERY)

## 2025-05-28 PROCEDURE — 3074F SYST BP LT 130 MM HG: CPT | Performed by: THORACIC SURGERY (CARDIOTHORACIC VASCULAR SURGERY)

## 2025-05-28 NOTE — LETTER
5/28/2025    Monica Paredes MD  [address] [facility] [address]    RE: Mingo LUCILA Napieren       Dear Colleague,     I had the pleasure of seeing Mingo Panda in the [facility].  Patient was seen and examined by me.  He underwent a CT scan of the abdomen and a defect was seen along the right atrium.  He is S/P a radical orchiectomy.  A CT scan of the chest will provide more information.  No findings on PE.  His echocardiogram revealed a mildly dilated aortic root of 4.2 cm and an ascending aorta of 4.8.  There was no aortic regurgitation.  Decision regarding further intervention awaits the findings of the chest CT scan.    Thank you for allowing me to participate in the care of your patient.      Sincerely,     Jeana Ro MD     [facility]  cc:   Monica Paredes MD  [address] [place] [address]

## 2025-05-28 NOTE — PATIENT INSTRUCTIONS
You were seen today in the [facility]    Surgery will be scheduled with Dr. Ro. Preoperative testing needed ( you will get a call to schedule these ):    Angiogram  CTA chest with contrast  Blood work    Please call JESSICA Meza surgery coordinator with any questions.  Thank you.    Susana Hernandez RNCC  Cardiovascular Surgery  Phone [phone]  Fax [phone]

## 2025-05-29 ENCOUNTER — TELEPHONE (OUTPATIENT)
Dept: ADMINISTRATIVE | Facility: CLINIC | Age: 50
End: 2025-05-29
Payer: COMMERCIAL

## 2025-05-29 RX ORDER — SODIUM CHLORIDE 9 MG/ML
INJECTION, SOLUTION INTRAVENOUS CONTINUOUS
OUTPATIENT
Start: 2025-06-06

## 2025-05-29 RX ORDER — FENTANYL CITRATE 50 UG/ML
25 INJECTION, SOLUTION INTRAMUSCULAR; INTRAVENOUS
Refills: 0 | OUTPATIENT
Start: 2025-06-06

## 2025-05-29 RX ORDER — LIDOCAINE 40 MG/G
CREAM TOPICAL
OUTPATIENT
Start: 2025-05-29

## 2025-05-29 RX ORDER — ASPIRIN 81 MG/1
243 TABLET, CHEWABLE ORAL ONCE
OUTPATIENT
Start: 2025-06-06

## 2025-05-29 RX ORDER — ASPIRIN 325 MG
325 TABLET ORAL ONCE
OUTPATIENT
Start: 2025-06-06 | End: 2025-05-29

## 2025-05-29 NOTE — LETTER
[facility]  Coronary Angiogram Instructions:    Mingo Napieren  [address]  [place] [facility] [place] [address]  [phone] (home)     You are scheduled for a coronary angiogram on June 6 at [facility], Cardiac Special Care ([facility]), [address] [place] [address].      Your arrival time is 6:30 AM.  Location is [facility] - [address] [place] [address] - Main Entrance of the Hospital  Please plan on being at the hospital all day.  At any time, emergencies and/or urgent cases may come up which could delay the start of your procedure.    Pre-procedure instructions - Coronary Angiogram  Patient Education    Please check in at the main hospital desk at [facility] [address] [place] [address]. From there you will be directed to the second floor where you will check in at the Cardiac Special Care ([facility]) for your procedure. Heart care staff at the desk will give you further direction by asking you to sit in the waiting room until a member from [facility] is able to come out and escort you back to the designated room for pre-procedure.    Pre-procedure medication instructions  Patient instructed on antiplatelet medication.  Continue medications as scheduled, with a small amount of water on the day of the procedure unless indicated.  Patient instructed to take 325 mg of Aspirin am of procedure: Yes  Do not take VIAGRA for 7 days prior to your angiogram.    Anticoagulation Medication Instructions   NA    IN CASE YOU ARE TAKING THE FOLLOWING:   Hold seven (7) days prior for once weekly injectable doses [semaglutide (Ozempic, Wegovy), dulaglutide  (Trulicity), exenatide ER (Bydureon), tirzepatide (Mounjaro)]   Hold the day before and day of for once daily injectable GLP-1 agonists [exenatide (Byetta), liraglutide (Saxenda,  Victoza)]   Hold seven (7) days for oral semaglutide (Rybelsus)    COVID-19: Patient was informed if they develop cold like symptoms  they should self test for COVID-19 at home. If results are positive, they should call [phone] to discuss next steps. This may include cancelling and rescheduling their procedure.  If patient is asymptomatic, no need to test for COVID-19.    Pre-procedure instructions  Do not have any solid food after midnight the night before your procedure.  Please shower the evening before or the morning of the procedure.  Please be sure to arrange for transportation home following procedure from a responsible family member of friend.   No driving for at least 24 hours.  You will need to have a responsible adult with you for 24 hours post-procedure.  You will receive sedation for this procedure.    Please call me with any questions.    Susana Hernandez  CV surgery RN care coordinator  [phone]

## 2025-05-29 NOTE — PATIENT INSTRUCTIONS
Mingo Panda  [address] [facility] [address]  [phone] (home)     Procedure cardiologist:  COLT  PCP:  Monica Paredes  H&P completed by:  JOSE L  Admit date: 6/6  Arrival time:  6:30 AM  Anticoagulation: None  CPAP: No  Previous PCI: NO  Bypass Grafts: No  Renal Issues: No  Diabetic?: No  Device?: No  Type:  NA    Reason for Visit:  Patient seen for pre-procedure education in preparation for: ASCENDING AORTIC REPLACEMENT    Procedure Prep:  Primary Cardiologist note dated: NA  EKG results obtained, dated: PRE PROCEDURE  Hemogram results obtained: PRE PROCEDURE  Basic Metabolic Panel results obtained: PRE PROCEDURE  Lipid Profile results obtained: 2/10/20    *PATIENTS RECORDS AVAILABLE IN [facility] UNLESS OTHERWISE INDICATED*      Patient Active Problem List   Diagnosis    Obesity    Benign Paroxysmal Positional Vertigo    Testis mass    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 (Patient not taking: Reported on 5/28/2025) 50 tablet 0    hydrochlorothiazide (HYDRODIURIL) 50 MG tablet Take 1 tablet (50 mg) by mouth daily. 90 tablet 1    senna-docusate (SENOKOT-S/PERICOLACE) 8.6-50 MG tablet Take 1-2 tablets by mouth 2 times daily. 30 tablet 0    sildenafil (VIAGRA) 50 MG tablet Take 50 mg by mouth daily as needed.         No Known Allergies      JESSICA Hernandez RN   Cardiovascular Surgery  [phone]

## 2025-05-29 NOTE — TELEPHONE ENCOUNTER
Per task, pt needs to schedule surgery with Dr. Ro after 6/6. Talked with pt and scheduled surgery for 6/24. Will call if anything changes

## 2025-05-30 ENCOUNTER — HOSPITAL ENCOUNTER (INPATIENT)
Facility: HOSPITAL | Age: 50
Setting detail: SURGERY ADMIT
End: 2025-05-30
Attending: THORACIC SURGERY (CARDIOTHORACIC VASCULAR SURGERY) | Admitting: THORACIC SURGERY (CARDIOTHORACIC VASCULAR SURGERY)
Payer: COMMERCIAL

## 2025-06-01 NOTE — PROGRESS NOTES
Patient was seen and examined by me.  He underwent a CT scan of the abdomen and a defect was seen along the right atrium.  He is S/P a radical orchiectomy.  A CT scan of the chest will provide more information.  No findings on PE.  His echocardiogram revealed a mildly dilated aortic root of 4.2 cm and an ascending aorta of 4.8.  There was no aortic regurgitation.  Decision regarding further intervention awaits the findings of the chest CT scan.

## 2025-06-03 ENCOUNTER — TELEPHONE (OUTPATIENT)
Dept: CARDIOLOGY | Facility: CLINIC | Age: 50
End: 2025-06-03

## 2025-06-03 ENCOUNTER — OFFICE VISIT (OUTPATIENT)
Dept: INTERNAL MEDICINE | Facility: CLINIC | Age: 50
End: 2025-06-03
Payer: COMMERCIAL

## 2025-06-03 VITALS
HEIGHT: 75 IN | WEIGHT: 261 LBS | SYSTOLIC BLOOD PRESSURE: 124 MMHG | DIASTOLIC BLOOD PRESSURE: 100 MMHG | HEART RATE: 87 BPM | BODY MASS INDEX: 32.45 KG/M2 | TEMPERATURE: 97.8 F | RESPIRATION RATE: 16 BRPM | OXYGEN SATURATION: 97 %

## 2025-06-03 DIAGNOSIS — G47.33 OSA (OBSTRUCTIVE SLEEP APNEA): ICD-10-CM

## 2025-06-03 DIAGNOSIS — I10 HYPERTENSION, UNSPECIFIED TYPE: ICD-10-CM

## 2025-06-03 DIAGNOSIS — E78.2 MIXED HYPERLIPIDEMIA: ICD-10-CM

## 2025-06-03 DIAGNOSIS — E78.1 HYPERTRIGLYCERIDEMIA: ICD-10-CM

## 2025-06-03 DIAGNOSIS — Z01.818 PREOP GENERAL PHYSICAL EXAM: Primary | ICD-10-CM

## 2025-06-03 DIAGNOSIS — C62.91: ICD-10-CM

## 2025-06-03 DIAGNOSIS — I77.810 ASCENDING AORTA DILATATION: ICD-10-CM

## 2025-06-03 PROCEDURE — 3080F DIAST BP >= 90 MM HG: CPT | Performed by: NURSE PRACTITIONER

## 2025-06-03 PROCEDURE — 99214 OFFICE O/P EST MOD 30 MIN: CPT | Performed by: NURSE PRACTITIONER

## 2025-06-03 PROCEDURE — 3074F SYST BP LT 130 MM HG: CPT | Performed by: NURSE PRACTITIONER

## 2025-06-03 NOTE — H&P (VIEW-ONLY)
Preoperative Evaluation  [facility]  [address] [facility] [address]  Phone: [phone]  Fax: [phone]  Primary Provider: Monica Paredes MD  Pre-op Performing Provider: Herlinda Francisco NP  Ad 3, 2025             5/30/2025   Surgical Information   What procedure is being done? Open chest aortic replacement surgery   Facility or Hospital where procedure/surgery will be performed: [facility]   Who is doing the procedure / surgery? Dr. Jefferson   Date of surgery / procedure: June 24   Time of surgery / procedure: 7am   Where do you plan to recover after surgery? at home with family     Fax number for surgical facility: Note does not need to be faxed, will be available electronically in Epic.    Assessment & Plan     The proposed surgical procedure is considered HIGH risk.    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  Reviewed preoperative guidelines.  Patient has labs scheduled for 6/10 for preop of labs previously ordered by cardiology and oncology.    Hypertriglyceridemia  Mixed hyperlipidemia  6/10 patient has scheduled for fasting lipid panel.  Last checked in 2020 with elevated triglycerides and an unknown LDL.    Hypertension  Patient currently on hydrochlorothiazide 50 mg daily.  Advised to hold this medication the morning of surgery.  Patient has labs scheduled to have potassium and kidney function checked prior to surgery.    Seminoma of testis, right (H)  Status post right radical neurectomy on 4/25/2025.  Follows oncology.    MORRIS  Compliant with CPAP.  Advised to bring CPAP machine the day of surgery.        - No identified additional risk factors other than previously addressed    Preoperative Medication Instructions  Antiplatelet or Anticoagulation Medication Instructions   - We  reviewed the medication list and the patient is not on an antiplatelet or anticoagulation medications.    Additional Medication Instructions  Do not take your Viagra for 1 week prior to surgery as recommended by your cardiologist.  Do not take your hydrochlorothiazide the morning of the procedure.  Can resume postoperatively if approved by your surgeon.    Recommendation  Approval given to proceed with proposed procedure, without further diagnostic evaluation.        Subjective   Garrison is a 49 year old, presenting for the following:  Pre-Op Exam (Aortic replacement surgery on 6/24 at [facility] by Dr Jefferson)          6/3/2025     3:50 PM   Additional Questions   Roomed by Samantha LINCOLN: Garrison is a very pleasant 49-year-old male with a history of MORRIS compliant with CPAP, hypertension, hyperlipidemia, hypertriglyceridemia and seminoma (stage 1) s/p right radical orchiectomy on 4/25/2025.  He is here today for preoperative evaluation prior to having ascending aortic replacement due to an ascending aortic aneurysm.  He had a CT scan of the abdomen after having his orchiectomy that had an abnormal finding that recommended echocardiogram.  His echocardiogram revealed a mildly dilated aortic root of 4.2 cm and ascending aorta 4.8 cm.  Patient has upcoming procedure for patient has plans for upcoming procedure on 6/6 for coronary angiogram followed by aortic valve replacement on 6/24/2025.  He has no acute concerns today.          5/30/2025   Pre-Op Questionnaire   Have you ever had a heart attack or stroke? No   Have you ever had surgery on your heart or blood vessels, such as a stent placement, a coronary artery bypass, or surgery on an artery in your head, neck, heart, or legs? No   Do you have chest pain with activity? No   Do you have a history of heart failure? No   Do you currently have a cold, bronchitis or symptoms of other infection? No   Do you have a cough, shortness of breath, or wheezing? No   Do you or anyone  in your family have previous history of blood clots? No   Do you or does anyone in your family have a serious bleeding problem such as prolonged bleeding following surgeries or cuts? No   Have you ever had problems with anemia or been told to take iron pills? No   Have you had any abnormal blood loss such as black, tarry or bloody stools? No   Have you ever had a blood transfusion? No   Are you willing to have a blood transfusion if it is medically needed before, during, or after your surgery? Yes   Have you or any of your relatives ever had problems with anesthesia? No   Do you have sleep apnea, excessive snoring or daytime drowsiness? (!) YES   Do you have a CPAP machine? Yes   Do you have any artifical heart valves or other implanted medical devices like a pacemaker, defibrillator, or continuous glucose monitor? No   Do you have artificial joints? No   Are you allergic to latex? No     Advance Care Planning    Discussed advance care planning with patient; however, patient declined at this time.    Preoperative Review of    reviewed - no record of controlled substances prescribed.          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  Past Surgical History:   Procedure Laterality Date    HC REPAIR ING HERNIA,5+Y/O,REDUCIBL      Description: Inguinal Hernia Repair For Person Over Age 5;  Recorded: 01/21/2010;  Comments: Biat, 77 and 79    ORCHIECTOMY INGUINAL Right 4/25/2025    Procedure: ORCHIECTOMY, INGUINAL APPROACH;  Surgeon: Jono Leonard MD;  [place] [facility] [place]    [place] CREATE EARDRUM OPENING,GEN ANESTH      Description: Ear Pressure  Equalization Tube, Insertion, General Anesthesi;  Recorded: 01/21/2010;  Comments: 1980ish     Current Outpatient Medications   Medication Sig Dispense Refill    hydrochlorothiazide (HYDRODIURIL) 50 MG tablet Take 1 tablet (50 mg) by mouth daily. 90 tablet 1    sildenafil (VIAGRA) 50 MG tablet Take 50 mg by mouth daily as needed.      acetaminophen (TYLENOL) 325 MG tablet Take 2 tablets (650 mg) by mouth every 4 hours as needed for mild pain. (Patient not taking: Reported on 5/28/2025) 50 tablet 0    senna-docusate (SENOKOT-S/PERICOLACE) 8.6-50 MG tablet Take 1-2 tablets by mouth 2 times daily. 30 tablet 0       No Known Allergies     Social History     Tobacco Use    Smoking status: Never     Passive exposure: Never    Smokeless tobacco: Never   Substance Use Topics    Alcohol use: Not on file       History   Drug Use Not on file           ROS:  Constitutional: No fever or unexplained weight loss.  No severe fatigue.    HEENT: Negative for ear pain, changes in hearing, frequent nosebleeds, nasal congestion, runny nose, sore throat, loose teeth. Denies use of dentures or partials.    Eyes: Denies any changes in vision or eye pain. Wears glasses.     Respiratory: Negative for cough, wheezing or shortness of breath.    Cardiovascular: No palpitations, tachycardia, chest pain or lower extremity edema.    Gastrointestinal: Negative for nausea, vomiting, abdominal pain, uncontrolled heartburn or changes in bowel movements.    Genitourinary: Negative for any urinary symptoms or changes in urinary habits.    Integumentary: Negative for any rash.    Musculoskeletal: No difficulty with weakness, walking or new joint pain.    Neurological: Negative for severe dizziness, headaches or numbness.    Psychiatric: No severe anxiety or significant depression.  No issues with sleep.  Denies recreational drug use or regular use of alcohol.    Allergic/immunologic: Negative for any history of complications with anesthesia.  No known  "exposure to HIV or hepatitis C.     Objective    BP (!) 124/100 (BP Location: Right arm, Patient Position: Sitting)   Pulse 87   Temp 97.8  F (36.6  C)   Resp 16   Ht 1.905 m (6' 3\")   Wt 118.4 kg (261 lb)   SpO2 97%   BMI 32.62 kg/m     Estimated body mass index is 32.62 kg/m  as calculated from the following:    Height as of this encounter: 1.905 m (6' 3\").    Weight as of this encounter: 118.4 kg (261 lb).  Physical Exam  Constitutional: In no acute distress.  Clean appearance.  Ears: Bilateral TMs are intact without any erythema or effusion.  Grossly normal hearing.  Oropharynx: Normal mucosa.  Dentition and gingiva is appropriate.  Posterior oropharynx without any abnormalities.  Neck: Supple.  Trachea is midline.  No thyromegaly.  Neck is without tenderness or masses.  Cardiovascular: Regular rate and rhythm.  Normal peripheral perfusion.  No edema.    Respiratory: Lungs are clear bilaterally.  Normal respiratory effort.  Skin: Skin is pink, warm and dry. No rashes.  Gastrointestinal: Soft and flat.  Normal bowel sounds.  Nontender throughout upon palpation.  No organomegaly or masses.    Musculoskeletal:  Normal range of motion of extremities.  Gait normal.  Able to mount exam table without difficulties.  Psychiatric: Appropriate affect and demeanor.  Memory intact.  Good insight and judgment.  Neurologic: Sensation and temperature of extremities appropriate.  No tremor or involuntary movement noted.      Recent Labs   Lab Test 04/16/25  1354 04/02/25  1021   HGB 16.7  --      --    INR 1.02  --    NA  --  143   POTASSIUM  --  4.1   CR  --  1.09        Diagnostics  Labs schedule for 6/10/25.  Results will be reviewed when available.   No EKG this visit, completed in the last 90 days.    Revised Cardiac Risk Index (RCRI)  The patient has the following serious cardiovascular risks for perioperative complications:   - High risk surgery (>5% cardiac complication risk) = 1 point     RCRI " Interpretation: 1 point: Class II (low risk - 0.9% complication rate)         Signed Electronically by: Herlinda Francisco NP  A copy of this evaluation report is provided to the requesting physician.

## 2025-06-03 NOTE — PROGRESS NOTES
Preoperative Evaluation  [facility]  [address] [facility] [address] [address]  Phone: [phone]  Fax: [phone]  Primary Provider: Monica Paredes MD  Pre-op Performing Provider: Herlinda Francisco NP  Ad 3, 2025             5/30/2025   Surgical Information   What procedure is being done? Open chest aortic replacement surgery   Facility or Hospital where procedure/surgery will be performed: [facility]   Who is doing the procedure / surgery? Dr. Jefferson   Date of surgery / procedure: June 24   Time of surgery / procedure: 7am   Where do you plan to recover after surgery? at home with family     Fax number for surgical facility: Note does not need to be faxed, will be available electronically in Epic.    Assessment & Plan     The proposed surgical procedure is considered HIGH risk.    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  Reviewed preoperative guidelines.  Patient has labs scheduled for 6/10 for preop of labs previously ordered by cardiology and oncology.    Hypertriglyceridemia  Mixed hyperlipidemia  6/10 patient has scheduled for fasting lipid panel.  Last checked in 2020 with elevated triglycerides and an unknown LDL.    Hypertension  Patient currently on hydrochlorothiazide 50 mg daily.  Advised to hold this medication the morning of surgery.  Patient has labs scheduled to have potassium and kidney function checked prior to surgery.    Seminoma of testis, right (H)  Status post right radical neurectomy on 4/25/2025.  Follows oncology.    MORRIS  Compliant with CPAP.  Advised to bring CPAP machine the day of surgery.        - No identified additional risk factors other than previously addressed    Preoperative Medication Instructions  Antiplatelet or Anticoagulation Medication Instructions   - We  reviewed the medication list and the patient is not on an antiplatelet or anticoagulation medications.    Additional Medication Instructions  Do not take your Viagra for 1 week prior to surgery as recommended by your cardiologist.  Do not take your hydrochlorothiazide the morning of the procedure.  Can resume postoperatively if approved by your surgeon.    Recommendation  Approval given to proceed with proposed procedure, without further diagnostic evaluation.        Subjective   Garrison is a 49 year old, presenting for the following:  Pre-Op Exam (Aortic replacement surgery on 6/24 at [facility] by Dr Jefferson)          6/3/2025     3:50 PM   Additional Questions   Roomed by Samantha LINCOLN: Garrison is a very pleasant 49-year-old male with a history of MORRIS compliant with CPAP, hypertension, hyperlipidemia, hypertriglyceridemia and seminoma (stage 1) s/p right radical orchiectomy on 4/25/2025.  He is here today for preoperative evaluation prior to having ascending aortic replacement due to an ascending aortic aneurysm.  He had a CT scan of the abdomen after having his orchiectomy that had an abnormal finding that recommended echocardiogram.  His echocardiogram revealed a mildly dilated aortic root of 4.2 cm and ascending aorta 4.8 cm.  Patient has upcoming procedure for patient has plans for upcoming procedure on 6/6 for coronary angiogram followed by aortic valve replacement on 6/24/2025.  He has no acute concerns today.          5/30/2025   Pre-Op Questionnaire   Have you ever had a heart attack or stroke? No   Have you ever had surgery on your heart or blood vessels, such as a stent placement, a coronary artery bypass, or surgery on an artery in your head, neck, heart, or legs? No   Do you have chest pain with activity? No   Do you have a history of heart failure? No   Do you currently have a cold, bronchitis or symptoms of other infection? No   Do you have a cough, shortness of breath, or wheezing? No   Do you or anyone  in your family have previous history of blood clots? No   Do you or does anyone in your family have a serious bleeding problem such as prolonged bleeding following surgeries or cuts? No   Have you ever had problems with anemia or been told to take iron pills? No   Have you had any abnormal blood loss such as black, tarry or bloody stools? No   Have you ever had a blood transfusion? No   Are you willing to have a blood transfusion if it is medically needed before, during, or after your surgery? Yes   Have you or any of your relatives ever had problems with anesthesia? No   Do you have sleep apnea, excessive snoring or daytime drowsiness? (!) YES   Do you have a CPAP machine? Yes   Do you have any artifical heart valves or other implanted medical devices like a pacemaker, defibrillator, or continuous glucose monitor? No   Do you have artificial joints? No   Are you allergic to latex? No     Advance Care Planning    Discussed advance care planning with patient; however, patient declined at this time.    Preoperative Review of    reviewed - no record of controlled substances prescribed.          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  Past Surgical History:   Procedure Laterality Date    HC REPAIR ING HERNIA,5+Y/O,REDUCIBL      Description: Inguinal Hernia Repair For Person Over Age 5;  Recorded: 01/21/2010;  Comments: Biat, 77 and 79    ORCHIECTOMY INGUINAL Right 4/25/2025    Procedure: ORCHIECTOMY, INGUINAL APPROACH;  Surgeon: Jono Leonard MD;  Location: [facility] [place]    MO CREATE EARDRUM OPENING,GEN ANESTH      Description: Ear Pressure  Equalization Tube, Insertion, General Anesthesi;  Recorded: 01/21/2010;  Comments: 1980ish     Current Outpatient Medications   Medication Sig Dispense Refill    hydrochlorothiazide (HYDRODIURIL) 50 MG tablet Take 1 tablet (50 mg) by mouth daily. 90 tablet 1    sildenafil (VIAGRA) 50 MG tablet Take 50 mg by mouth daily as needed.      acetaminophen (TYLENOL) 325 MG tablet Take 2 tablets (650 mg) by mouth every 4 hours as needed for mild pain. (Patient not taking: Reported on 5/28/2025) 50 tablet 0    senna-docusate (SENOKOT-S/PERICOLACE) 8.6-50 MG tablet Take 1-2 tablets by mouth 2 times daily. 30 tablet 0       No Known Allergies     Social History     Tobacco Use    Smoking status: Never     Passive exposure: Never    Smokeless tobacco: Never   Substance Use Topics    Alcohol use: Not on file       History   Drug Use Not on file           ROS:  Constitutional: No fever or unexplained weight loss.  No severe fatigue.    HEENT: Negative for ear pain, changes in hearing, frequent nosebleeds, nasal congestion, runny nose, sore throat, loose teeth. Denies use of dentures or partials.    Eyes: Denies any changes in vision or eye pain. Wears glasses.     Respiratory: Negative for cough, wheezing or shortness of breath.    Cardiovascular: No palpitations, tachycardia, chest pain or lower extremity edema.    Gastrointestinal: Negative for nausea, vomiting, abdominal pain, uncontrolled heartburn or changes in bowel movements.    Genitourinary: Negative for any urinary symptoms or changes in urinary habits.    Integumentary: Negative for any rash.    Musculoskeletal: No difficulty with weakness, walking or new joint pain.    Neurological: Negative for severe dizziness, headaches or numbness.    Psychiatric: No severe anxiety or significant depression.  No issues with sleep.  Denies recreational drug use or regular use of alcohol.    Allergic/immunologic: Negative for any history of complications with anesthesia.  No known  "exposure to HIV or hepatitis C.     Objective    BP (!) 124/100 (BP Location: Right arm, Patient Position: Sitting)   Pulse 87   Temp 97.8  F (36.6  C)   Resp 16   Ht 1.905 m (6' 3\")   Wt 118.4 kg (261 lb)   SpO2 97%   BMI 32.62 kg/m     Estimated body mass index is 32.62 kg/m  as calculated from the following:    Height as of this encounter: 1.905 m (6' 3\").    Weight as of this encounter: 118.4 kg (261 lb).  Physical Exam  Constitutional: In no acute distress.  Clean appearance.  Ears: Bilateral TMs are intact without any erythema or effusion.  Grossly normal hearing.  Oropharynx: Normal mucosa.  Dentition and gingiva is appropriate.  Posterior oropharynx without any abnormalities.  Neck: Supple.  Trachea is midline.  No thyromegaly.  Neck is without tenderness or masses.  Cardiovascular: Regular rate and rhythm.  Normal peripheral perfusion.  No edema.    Respiratory: Lungs are clear bilaterally.  Normal respiratory effort.  Skin: Skin is pink, warm and dry. No rashes.  Gastrointestinal: Soft and flat.  Normal bowel sounds.  Nontender throughout upon palpation.  No organomegaly or masses.    Musculoskeletal:  Normal range of motion of extremities.  Gait normal.  Able to mount exam table without difficulties.  Psychiatric: Appropriate affect and demeanor.  Memory intact.  Good insight and judgment.  Neurologic: Sensation and temperature of extremities appropriate.  No tremor or involuntary movement noted.      Recent Labs   Lab Test 04/16/25  1354 04/02/25  1021   HGB 16.7  --      --    INR 1.02  --    NA  --  143   POTASSIUM  --  4.1   CR  --  1.09        Diagnostics  Labs schedule for 6/10/25.  Results will be reviewed when available.   No EKG this visit, completed in the last 90 days.    Revised Cardiac Risk Index (RCRI)  The patient has the following serious cardiovascular risks for perioperative complications:   - High risk surgery (>5% cardiac complication risk) = 1 point     RCRI " Interpretation: 1 point: Class II (low risk - 0.9% complication rate)         Signed Electronically by: Herlinda Francisco NP  A copy of this evaluation report is provided to the requesting physician.

## 2025-06-03 NOTE — PATIENT INSTRUCTIONS
How to Take Your Medication Before Surgery  Preoperative Medication Instructions   Antiplatelet or Anticoagulation Medication Instructions   - We reviewed the medication list and the patient is not on an antiplatelet or anticoagulation medications.    Additional Medication Instructions  Do not take your Viagra for 1 week prior to surgery as recommended by your cardiologist.  Do not take your hydrochlorothiazide the morning of the procedure.  Can resume postoperatively if approved by your surgeon.   Do not take any NSAIDs for about 1 week prior to the procedure.  Tylenol is okay to take as needed if you have any pain.     Bring your CPAP with you the day of your procedures.    Remove any jewelry and leave at home the day of the procedure.     Patient Education   Preparing for Your Surgery  For Adults  Getting started  In most cases, a nurse will call to review your health history and instructions. They will give you an arrival time based on your scheduled surgery time. Please be ready to share:  Your doctor's clinic name and phone number  Your medical, surgical, and anesthesia history  A list of allergies and sensitivities  A list of medicines, including herbal treatments and over-the-counter drugs  Whether the patient has a legal guardian (ask how to send us the papers in advance)  Note: You may not receive a call if you were seen at our PAC (Preoperative Assessment Center).  Please tell us if you're pregnant--or if there's any chance you might be pregnant. Some surgeries may injure a fetus (unborn baby), so they require a pregnancy test. Surgeries that are safe for a fetus don't always need a test, and you can choose whether to have one.   Preparing for surgery  Within 10 to 30 days of surgery: Have a pre-op exam (sometimes called an H&P, or History and Physical). This can be done at a clinic or pre-operative center.  If you're having a , you may not need this exam. Talk to your care team.  At your pre-op  exam, talk to your care team about all medicines you take. (This includes CBD oil and any drugs, such as THC, marijuana, and other forms of cannabis.) If you need to stop any medicine before surgery, ask when to start taking it again.  This is for your safety. Many medicines and drugs can make you bleed too much during surgery. Some change how well surgery (anesthesia) drugs work.  Call your insurance company to let them know you're having surgery. (If you don't have insurance, call [phone].)  Call your clinic if there's any change in your health. This includes a scrape or scratch near the surgery site, or any signs of a cold (sore throat, runny nose, cough, rash, fever).  Eating and drinking guidelines  For your safety: Unless your surgeon tells you otherwise, follow the guidelines below.  Eat and drink as normal until 8 hours before you arrive for surgery. After that, no food or milk. You can spit out gum when you arrive.  Drink clear liquids until 2 hours before you arrive. These are liquids you can see through, like water, Gatorade, and Propel Water. They also include plain black coffee and tea (no cream or milk).  No alcohol for 24 hours before you arrive. The night before surgery, stop any drinks that contain THC.  If your care team tells you to take medicine on the morning of surgery, it's okay to take it with a sip of water. No other medicines or drugs are allowed (including CBD oil)--follow your care team's instructions.  If you have questions the day of surgery, call your hospital or surgery center.   Preventing infection  Shower or bathe the night before and the morning of surgery. Follow the instructions your clinic gave you. (If no instructions, use regular soap.)  Don't shave or clip hair near your surgery site. We'll remove the hair if needed.  Don't smoke or vape the morning of surgery. No chewing tobacco for 6 hours before you arrive. A nicotine patch is okay. You may spit out nicotine gum when  you arrive.  For some surgeries, the surgeon will tell you to fully quit smoking and nicotine.  We will make every effort to keep you safe from infection. We will:  Clean our hands often with soap and water (or an alcohol-based hand rub).  Clean the skin at your surgery site with a special soap that kills germs.  Give you a special gown to keep you warm. (Cold raises the risk of infection.)  Wear hair covers, masks, gowns, and gloves during surgery.  Give antibiotic medicine, if prescribed. Not all surgeries need this medicine.  What to bring on the day of surgery  Photo ID and insurance card  Copy of your health care directive, if you have one  Glasses and hearing aids (bring cases)  You can't wear contacts during surgery  Inhaler and eye drops, if you use them (tell us about these when you arrive)  CPAP machine or breathing device, if you use them  A few personal items, if spending the night  If you have . . .  A pacemaker, ICD (cardiac defibrillator), or other implant: Bring the ID card.  An implanted stimulator: Bring the remote control.  A legal guardian: Bring a copy of the certified (court-stamped) guardianship papers.  Please remove any jewelry, including body piercings. Leave jewelry and other valuables at home.  If you're going home the day of surgery  You must have a support person drive you home. They should stay with you overnight, and they may need to help with your self-care.  If you don't have a support person, please tells us as soon as possible. We can help.  After surgery  If it's hard to control your pain or you need more pain medicine, please call your surgeon's office.  Questions?   If you have any questions for your care team, list them here:    ____________________________________________________________________________________________________________________________________________________________________________________________________________________________________________________________  For informational purposes only. Not to replace the advice of your health care provider. Copyright   2003, 2019 [facility]. All rights reserved. Clinically reviewed by Héctor Bautista MD. [employer] [id number] - REV 02/25.

## 2025-06-03 NOTE — TELEPHONE ENCOUNTER
Patient called to go over angiogram instructions prior to his angiogram on 6/6; message left to call back.

## 2025-06-05 ENCOUNTER — TELEPHONE (OUTPATIENT)
Dept: CARDIOLOGY | Facility: CLINIC | Age: 50
End: 2025-06-05
Payer: COMMERCIAL

## 2025-06-05 NOTE — TELEPHONE ENCOUNTER
Angiogram instructions reviewed and patient located them in his MyChart.  Patient confirmed that he has been holding his sildenafil and that he does have aspirin to take tomorrow morning; instructed to take 325 mg; patient verbalized understanding.

## 2025-06-06 ENCOUNTER — HOSPITAL ENCOUNTER (OUTPATIENT)
Facility: HOSPITAL | Age: 50
Discharge: HOME | End: 2025-06-06
Attending: STUDENT IN AN ORGANIZED HEALTH CARE EDUCATION/TRAINING PROGRAM | Admitting: STUDENT IN AN ORGANIZED HEALTH CARE EDUCATION/TRAINING PROGRAM
Payer: COMMERCIAL

## 2025-06-06 VITALS
HEIGHT: 75 IN | BODY MASS INDEX: 32.45 KG/M2 | HEART RATE: 73 BPM | WEIGHT: 261 LBS | RESPIRATION RATE: 20 BRPM | DIASTOLIC BLOOD PRESSURE: 75 MMHG | TEMPERATURE: 97.9 F | SYSTOLIC BLOOD PRESSURE: 127 MMHG | OXYGEN SATURATION: 95 %

## 2025-06-06 DIAGNOSIS — I77.810 ASCENDING AORTA DILATATION: ICD-10-CM

## 2025-06-06 PROBLEM — Z98.890 STATUS POST CORONARY ANGIOGRAM: Status: ACTIVE | Noted: 2025-06-06

## 2025-06-06 LAB
ABO + RH BLD: NORMAL
ANION GAP SERPL CALCULATED.3IONS-SCNC: 9 MMOL/L (ref 7–15)
ATRIAL RATE - MUSE: 74 BPM
BLD GP AB SCN SERPL QL: NEGATIVE
BUN SERPL-MCNC: 18.7 MG/DL (ref 6–20)
CALCIUM SERPL-MCNC: 9.3 MG/DL (ref 8.8–10.4)
CHLORIDE SERPL-SCNC: 103 MMOL/L (ref 98–107)
CREAT SERPL-MCNC: 1.1 MG/DL (ref 0.67–1.17)
DIASTOLIC BLOOD PRESSURE - MUSE: NORMAL MMHG
EGFRCR SERPLBLD CKD-EPI 2021: 82 ML/MIN/1.73M2
ERYTHROCYTE [DISTWIDTH] IN BLOOD BY AUTOMATED COUNT: 13.1 % (ref 10–15)
GLUCOSE SERPL-MCNC: 106 MG/DL (ref 70–99)
HCO3 SERPL-SCNC: 27 MMOL/L (ref 22–29)
HCT VFR BLD AUTO: 45.9 % (ref 40–53)
HGB BLD-MCNC: 16.2 G/DL (ref 13.3–17.7)
INTERPRETATION ECG - MUSE: NORMAL
MCH RBC QN AUTO: 29.5 PG (ref 26.5–33)
MCHC RBC AUTO-ENTMCNC: 35.3 G/DL (ref 31.5–36.5)
MCV RBC AUTO: 84 FL (ref 78–100)
P AXIS - MUSE: 45 DEGREES
PLATELET # BLD AUTO: 201 10E3/UL (ref 150–450)
POTASSIUM SERPL-SCNC: 3.5 MMOL/L (ref 3.4–5.3)
PR INTERVAL - MUSE: 164 MS
QRS DURATION - MUSE: 100 MS
QT - MUSE: 408 MS
QTC - MUSE: 452 MS
R AXIS - MUSE: -9 DEGREES
RBC # BLD AUTO: 5.49 10E6/UL (ref 4.4–5.9)
SODIUM SERPL-SCNC: 139 MMOL/L (ref 135–145)
SPECIMEN EXP DATE BLD: NORMAL
SYSTOLIC BLOOD PRESSURE - MUSE: NORMAL MMHG
T AXIS - MUSE: 45 DEGREES
VENTRICULAR RATE- MUSE: 74 BPM
WBC # BLD AUTO: 5.6 10E3/UL (ref 4–11)

## 2025-06-06 PROCEDURE — 36415 COLL VENOUS BLD VENIPUNCTURE: CPT | Performed by: THORACIC SURGERY (CARDIOTHORACIC VASCULAR SURGERY)

## 2025-06-06 PROCEDURE — 250N000009 HC RX 250: Performed by: STUDENT IN AN ORGANIZED HEALTH CARE EDUCATION/TRAINING PROGRAM

## 2025-06-06 PROCEDURE — 93005 ELECTROCARDIOGRAM TRACING: CPT

## 2025-06-06 PROCEDURE — 93010 ELECTROCARDIOGRAM REPORT: CPT | Mod: 59 | Performed by: STUDENT IN AN ORGANIZED HEALTH CARE EDUCATION/TRAINING PROGRAM

## 2025-06-06 PROCEDURE — 93458 L HRT ARTERY/VENTRICLE ANGIO: CPT | Performed by: STUDENT IN AN ORGANIZED HEALTH CARE EDUCATION/TRAINING PROGRAM

## 2025-06-06 PROCEDURE — 250N000011 HC RX IP 250 OP 636: Performed by: STUDENT IN AN ORGANIZED HEALTH CARE EDUCATION/TRAINING PROGRAM

## 2025-06-06 PROCEDURE — C1894 INTRO/SHEATH, NON-LASER: HCPCS | Performed by: STUDENT IN AN ORGANIZED HEALTH CARE EDUCATION/TRAINING PROGRAM

## 2025-06-06 PROCEDURE — 93458 L HRT ARTERY/VENTRICLE ANGIO: CPT | Mod: 26 | Performed by: STUDENT IN AN ORGANIZED HEALTH CARE EDUCATION/TRAINING PROGRAM

## 2025-06-06 PROCEDURE — 255N000002 HC RX 255 OP 636: Performed by: STUDENT IN AN ORGANIZED HEALTH CARE EDUCATION/TRAINING PROGRAM

## 2025-06-06 PROCEDURE — C1887 CATHETER, GUIDING: HCPCS | Performed by: STUDENT IN AN ORGANIZED HEALTH CARE EDUCATION/TRAINING PROGRAM

## 2025-06-06 PROCEDURE — 999N000054 HC STATISTIC EKG NON-CHARGEABLE

## 2025-06-06 PROCEDURE — 85041 AUTOMATED RBC COUNT: CPT | Performed by: THORACIC SURGERY (CARDIOTHORACIC VASCULAR SURGERY)

## 2025-06-06 PROCEDURE — 999N000099 HC STATISTIC MODERATE SEDATION < 10 MIN: Performed by: STUDENT IN AN ORGANIZED HEALTH CARE EDUCATION/TRAINING PROGRAM

## 2025-06-06 PROCEDURE — 258N000003 HC RX IP 258 OP 636: Performed by: THORACIC SURGERY (CARDIOTHORACIC VASCULAR SURGERY)

## 2025-06-06 PROCEDURE — 80048 BASIC METABOLIC PNL TOTAL CA: CPT | Performed by: THORACIC SURGERY (CARDIOTHORACIC VASCULAR SURGERY)

## 2025-06-06 PROCEDURE — 272N000001 HC OR GENERAL SUPPLY STERILE: Performed by: STUDENT IN AN ORGANIZED HEALTH CARE EDUCATION/TRAINING PROGRAM

## 2025-06-06 PROCEDURE — 86901 BLOOD TYPING SEROLOGIC RH(D): CPT | Performed by: THORACIC SURGERY (CARDIOTHORACIC VASCULAR SURGERY)

## 2025-06-06 RX ORDER — IODIXANOL 320 MG/ML
INJECTION, SOLUTION INTRAVASCULAR
Status: DISCONTINUED | OUTPATIENT
Start: 2025-06-06 | End: 2025-06-06 | Stop reason: HOSPADM

## 2025-06-06 RX ORDER — NALOXONE HYDROCHLORIDE 0.4 MG/ML
0.4 INJECTION, SOLUTION INTRAMUSCULAR; INTRAVENOUS; SUBCUTANEOUS
Status: DISCONTINUED | OUTPATIENT
Start: 2025-06-06 | End: 2025-06-06 | Stop reason: HOSPADM

## 2025-06-06 RX ORDER — NALOXONE HYDROCHLORIDE 0.4 MG/ML
0.2 INJECTION, SOLUTION INTRAMUSCULAR; INTRAVENOUS; SUBCUTANEOUS
Status: DISCONTINUED | OUTPATIENT
Start: 2025-06-06 | End: 2025-06-06 | Stop reason: HOSPADM

## 2025-06-06 RX ORDER — HEPARIN SODIUM 200 [USP'U]/100ML
INJECTION, SOLUTION INTRAVENOUS
Status: DISCONTINUED | OUTPATIENT
Start: 2025-06-06 | End: 2025-06-06 | Stop reason: HOSPADM

## 2025-06-06 RX ORDER — SODIUM CHLORIDE 9 MG/ML
INJECTION, SOLUTION INTRAVENOUS CONTINUOUS
Status: DISCONTINUED | OUTPATIENT
Start: 2025-06-06 | End: 2025-06-06 | Stop reason: HOSPADM

## 2025-06-06 RX ORDER — FLUMAZENIL 0.1 MG/ML
0.2 INJECTION, SOLUTION INTRAVENOUS
Status: DISCONTINUED | OUTPATIENT
Start: 2025-06-06 | End: 2025-06-06 | Stop reason: HOSPADM

## 2025-06-06 RX ORDER — OXYCODONE HYDROCHLORIDE 5 MG/1
5 TABLET ORAL EVERY 4 HOURS PRN
Status: DISCONTINUED | OUTPATIENT
Start: 2025-06-06 | End: 2025-06-06 | Stop reason: HOSPADM

## 2025-06-06 RX ORDER — LIDOCAINE 40 MG/G
CREAM TOPICAL
Status: DISCONTINUED | OUTPATIENT
Start: 2025-06-06 | End: 2025-06-06 | Stop reason: HOSPADM

## 2025-06-06 RX ORDER — ASPIRIN 81 MG/1
243 TABLET, CHEWABLE ORAL ONCE
Status: COMPLETED | OUTPATIENT
Start: 2025-06-06 | End: 2025-06-06

## 2025-06-06 RX ORDER — FENTANYL CITRATE 50 UG/ML
25 INJECTION, SOLUTION INTRAMUSCULAR; INTRAVENOUS
Refills: 0 | Status: DISCONTINUED | OUTPATIENT
Start: 2025-06-06 | End: 2025-06-06 | Stop reason: HOSPADM

## 2025-06-06 RX ORDER — ASPIRIN 325 MG
325 TABLET ORAL ONCE
Status: COMPLETED | OUTPATIENT
Start: 2025-06-06 | End: 2025-06-06

## 2025-06-06 RX ORDER — FENTANYL CITRATE 50 UG/ML
INJECTION, SOLUTION INTRAMUSCULAR; INTRAVENOUS
Status: DISCONTINUED | OUTPATIENT
Start: 2025-06-06 | End: 2025-06-06 | Stop reason: HOSPADM

## 2025-06-06 RX ORDER — OXYCODONE HYDROCHLORIDE 5 MG/1
10 TABLET ORAL EVERY 4 HOURS PRN
Status: DISCONTINUED | OUTPATIENT
Start: 2025-06-06 | End: 2025-06-06 | Stop reason: HOSPADM

## 2025-06-06 RX ORDER — ATROPINE SULFATE 0.1 MG/ML
0.5 INJECTION INTRAVENOUS
Status: DISCONTINUED | OUTPATIENT
Start: 2025-06-06 | End: 2025-06-06 | Stop reason: HOSPADM

## 2025-06-06 RX ORDER — ACETAMINOPHEN 325 MG/1
650 TABLET ORAL EVERY 4 HOURS PRN
Status: DISCONTINUED | OUTPATIENT
Start: 2025-06-06 | End: 2025-06-06 | Stop reason: HOSPADM

## 2025-06-06 RX ORDER — FENTANYL CITRATE 50 UG/ML
25 INJECTION, SOLUTION INTRAMUSCULAR; INTRAVENOUS
Status: DISCONTINUED | OUTPATIENT
Start: 2025-06-06 | End: 2025-06-06 | Stop reason: HOSPADM

## 2025-06-06 RX ADMIN — SODIUM CHLORIDE: 0.9 INJECTION, SOLUTION INTRAVENOUS at 07:38

## 2025-06-06 ASSESSMENT — ACTIVITIES OF DAILY LIVING (ADL)
ADLS_ACUITY_SCORE: 41

## 2025-06-06 NOTE — INTERVAL H&P NOTE
"I have reviewed the surgical (or preoperative) H&P that is linked to this encounter, and examined the patient. There are no significant changes    Clinical Conditions Present on Arrival:  Clinically Significant Risk Factors Present on Admission                       # Obesity: Estimated body mass index is 32.62 kg/m  as calculated from the following:    Height as of 6/3/25: 1.905 m (6' 3\").    Weight as of 6/3/25: 118.4 kg (261 lb).       "

## 2025-06-06 NOTE — PROGRESS NOTES
Patient discharged home with his wife in stable condition. Patient and wife verbalize understanding of discharge instructions and follow up appointment. No questions or concerns at time of discharge.

## 2025-06-06 NOTE — PRE-PROCEDURE
GENERAL PRE-PROCEDURE:   Procedure:  Coronary angiogram, left heart catheterization  Date/Time:  6/6/2025 7:11 AM    Written consent obtained?: Yes    Risks and benefits: Risks, benefits and alternatives were discussed    Consent given by:  Patient  Patient states understanding of procedure being performed: Yes    Patient's understanding of procedure matches consent: Yes    Procedure consent matches procedure scheduled: Yes    Expected level of sedation:  Moderate  Appropriately NPO:  Yes  ASA Class:  3 (thoracic aortic aneurysm, HTN, HLD, Class I obesity; BMI 32.62kg/m2, MORRIS; on CPAP)  Mallampati  :  Grade 3- soft palate visible, posterior pharyngeal wall not visible  Lungs:  Lungs clear with good breath sounds bilaterally  Heart:  Normal heart sounds and rate  History & Physical reviewed:  History and physical reviewed and updates made (see comment)  H&P Comments:  Clinically Significant Risk Factors Present on Admission    Cardiovascular : thoracic aortic aneurysm, HTN, HLD, Class I obesity; BMI 32.62kg/m2    Fluid & Electrolyte Disorders : Not present on admission    Gastroenterology : Not present on admission    Hematology/Oncology : Not present on admission    Nephrology : Not present on admission    Neurology : Not present on admission    Pulmonology : MORRIS; on CPAP    Systemic : Class I obesity; BMI 32.62kg/m2    Statement of review:  I have reviewed the lab findings, diagnostic data, medications, and the plan for sedation

## 2025-06-06 NOTE — DISCHARGE INSTRUCTIONS

## 2025-06-10 ENCOUNTER — MYC MEDICAL ADVICE (OUTPATIENT)
Dept: FAMILY MEDICINE | Facility: CLINIC | Age: 50
End: 2025-06-10

## 2025-06-10 ENCOUNTER — APPOINTMENT (OUTPATIENT)
Dept: LAB | Facility: HOSPITAL | Age: 50
End: 2025-06-10
Payer: COMMERCIAL

## 2025-06-10 DIAGNOSIS — G47.30 SLEEP APNEA, UNSPECIFIED TYPE: Primary | ICD-10-CM

## 2025-06-10 PROCEDURE — 84134 ASSAY OF PREALBUMIN: CPT | Performed by: THORACIC SURGERY (CARDIOTHORACIC VASCULAR SURGERY)

## 2025-06-11 ENCOUNTER — ANCILLARY PROCEDURE (OUTPATIENT)
Dept: CT IMAGING | Facility: CLINIC | Age: 50
End: 2025-06-11
Attending: THORACIC SURGERY (CARDIOTHORACIC VASCULAR SURGERY)
Payer: COMMERCIAL

## 2025-06-11 ENCOUNTER — PATIENT OUTREACH (OUTPATIENT)
Dept: CARE COORDINATION | Facility: CLINIC | Age: 50
End: 2025-06-11

## 2025-06-11 DIAGNOSIS — I77.810 ASCENDING AORTA DILATATION: ICD-10-CM

## 2025-06-11 PROCEDURE — 71275 CT ANGIOGRAPHY CHEST: CPT | Performed by: RADIOLOGY

## 2025-06-11 RX ORDER — IOPAMIDOL 755 MG/ML
120 INJECTION, SOLUTION INTRAVASCULAR ONCE
Status: COMPLETED | OUTPATIENT
Start: 2025-06-11 | End: 2025-06-11

## 2025-06-11 RX ADMIN — IOPAMIDOL 120 ML: 755 INJECTION, SOLUTION INTRAVASCULAR at 10:19

## 2025-06-12 ENCOUNTER — TELEPHONE (OUTPATIENT)
Dept: CARDIOLOGY | Facility: CLINIC | Age: 50
End: 2025-06-12
Payer: COMMERCIAL

## 2025-06-12 NOTE — TELEPHONE ENCOUNTER
Dr. Ro reviewed CTA chest done 6/11 and doesn't think pt needs surgery at this time. She called the pt to let him know that she would like to follow his aneurysm with another CTA chest in 6 months, and that surgery will be cancelled.    Pt understandable and agreeable to the plan. Message sent to schedulers for follow up.

## 2025-07-08 ENCOUNTER — TELEPHONE (OUTPATIENT)
Dept: CARDIOLOGY | Facility: CLINIC | Age: 50
End: 2025-07-08
Payer: COMMERCIAL

## (undated) DEVICE — SOL WATER IRRIG 500ML BOTTLE 2F7113

## (undated) DEVICE — CUSTOM PACK CORONARY SAN5BCRHEA

## (undated) DEVICE — SU VICRYL 0 TIE 54" J608H

## (undated) DEVICE — DRSG PRIMAPORE 02X3" 7133

## (undated) DEVICE — SUPPORTER SCROTAL SUSPENSORY LG LATEX

## (undated) DEVICE — SYR EAR BULB 3OZ 0035830

## (undated) DEVICE — SOL ADH LIQUID BENZOIN SWAB 0.6ML C1544

## (undated) DEVICE — MANIFOLD KIT ANGIO AUTOMATED 014613

## (undated) DEVICE — ESU GROUND PAD ADULT W/CORD E7507

## (undated) DEVICE — DRSG STERI STRIP 1/2X4" R1547

## (undated) DEVICE — SU VICRYL 2-0 SH 27" UND J417H

## (undated) DEVICE — CATH 6FR X 100CM, OPTITORQUE C

## (undated) DEVICE — SU CHROMIC 3-0 SH 27" G122H

## (undated) DEVICE — ELECTRODE DEFIB CADENCE 22550R

## (undated) DEVICE — SOL NACL 0.9% IRRIG 500ML BOTTLE 2F7123

## (undated) DEVICE — DRSG TELFA 3X8" 1238

## (undated) DEVICE — PREP CHLORAPREP 26ML TINTED HI-LITE ORANGE 930815

## (undated) DEVICE — BLADE CLIPPER DISP 4406

## (undated) DEVICE — SHTH INTRO 0.021IN ID 6FR DIA

## (undated) DEVICE — SUCTION TIP YANKAUER W/O VENT K86

## (undated) DEVICE — KIT HAND CONTROL ACIST 014644 AR-P54

## (undated) DEVICE — DRAPE LAP TRANSVERSE 29421

## (undated) DEVICE — SYR ANGIOGRAPHY MULTIUSE KIT ACIST 014612

## (undated) DEVICE — BLADE KNIFE SURG 15 371115

## (undated) DEVICE — SU MONOCRYL 4-0 PS-2 27" UND Y426H

## (undated) DEVICE — DRAIN PENROSE 0.25"X18" LATEX FREE GR201

## (undated) DEVICE — DRSG KERLIX FLUFFS X5

## (undated) DEVICE — LINEN TOWEL PACK X5 5464

## (undated) DEVICE — SU SILK 0 SH 30" K834H

## (undated) DEVICE — PACK MINOR CUSTOM ASC

## (undated) DEVICE — SLEEVE TR BAND RADIAL COMPRESSION DEVICE 29CM XX-RF06L

## (undated) DEVICE — GLOVE BIOGEL PI MICRO SZ 7.5 48575

## (undated) RX ORDER — FENTANYL CITRATE 50 UG/ML
INJECTION, SOLUTION INTRAMUSCULAR; INTRAVENOUS
Status: DISPENSED
Start: 2025-06-06

## (undated) RX ORDER — ACETAMINOPHEN 325 MG/1
TABLET ORAL
Status: DISPENSED
Start: 2025-04-25

## (undated) RX ORDER — LABETALOL HYDROCHLORIDE 5 MG/ML
INJECTION, SOLUTION INTRAVENOUS
Status: DISPENSED
Start: 2025-04-25

## (undated) RX ORDER — FENTANYL CITRATE 50 UG/ML
INJECTION, SOLUTION INTRAMUSCULAR; INTRAVENOUS
Status: DISPENSED
Start: 2025-04-25

## (undated) RX ORDER — GLYCOPYRROLATE 0.2 MG/ML
INJECTION, SOLUTION INTRAMUSCULAR; INTRAVENOUS
Status: DISPENSED
Start: 2025-04-25

## (undated) RX ORDER — OXYCODONE HYDROCHLORIDE 5 MG/1
TABLET ORAL
Status: DISPENSED
Start: 2025-04-25

## (undated) RX ORDER — CEFAZOLIN SODIUM 2 G/50ML
SOLUTION INTRAVENOUS
Status: DISPENSED
Start: 2025-04-25

## (undated) RX ORDER — BUPIVACAINE HYDROCHLORIDE 5 MG/ML
INJECTION, SOLUTION EPIDURAL; INTRACAUDAL; PERINEURAL
Status: DISPENSED
Start: 2025-04-25

## (undated) RX ORDER — PROPOFOL 10 MG/ML
INJECTION, EMULSION INTRAVENOUS
Status: DISPENSED
Start: 2025-04-25

## (undated) RX ORDER — ONDANSETRON 2 MG/ML
INJECTION INTRAMUSCULAR; INTRAVENOUS
Status: DISPENSED
Start: 2025-04-25

## (undated) RX ORDER — DEXAMETHASONE SODIUM PHOSPHATE 4 MG/ML
INJECTION, SOLUTION INTRA-ARTICULAR; INTRALESIONAL; INTRAMUSCULAR; INTRAVENOUS; SOFT TISSUE
Status: DISPENSED
Start: 2025-04-25

## (undated) RX ORDER — LIDOCAINE HYDROCHLORIDE 10 MG/ML
INJECTION, SOLUTION EPIDURAL; INFILTRATION; INTRACAUDAL; PERINEURAL
Status: DISPENSED
Start: 2025-04-25